# Patient Record
Sex: FEMALE | Race: WHITE | NOT HISPANIC OR LATINO | Employment: UNEMPLOYED | URBAN - METROPOLITAN AREA
[De-identification: names, ages, dates, MRNs, and addresses within clinical notes are randomized per-mention and may not be internally consistent; named-entity substitution may affect disease eponyms.]

---

## 2023-09-23 PROBLEM — R10.2 PELVIC PAIN IN FEMALE: Status: ACTIVE | Noted: 2023-09-23

## 2023-09-23 PROBLEM — D68.59 THROMBOPHILIA (MULTI): Status: ACTIVE | Noted: 2023-09-23

## 2023-09-23 PROBLEM — I51.9 LV DYSFUNCTION: Status: ACTIVE | Noted: 2023-09-23

## 2023-09-23 PROBLEM — R07.9 CHEST PAIN ON EXERTION: Status: ACTIVE | Noted: 2023-09-23

## 2023-09-23 PROBLEM — J45.909 ASTHMA (HHS-HCC): Status: ACTIVE | Noted: 2023-09-23

## 2023-09-23 PROBLEM — I50.42 CHRONIC COMBINED SYSTOLIC AND DIASTOLIC CHF (CONGESTIVE HEART FAILURE) (MULTI): Status: ACTIVE | Noted: 2023-09-23

## 2023-09-23 PROBLEM — M46.1 SACROILIAC INFLAMMATION (CMS-HCC): Status: ACTIVE | Noted: 2023-09-23

## 2023-09-23 PROBLEM — M47.817 ARTHRITIS OF LUMBOSACRAL SPINE: Status: ACTIVE | Noted: 2023-09-23

## 2023-09-23 PROBLEM — Z95.0 PRESENCE OF CARDIAC PACEMAKER: Status: ACTIVE | Noted: 2023-09-23

## 2023-09-23 PROBLEM — R00.0 TACHYCARDIA: Status: ACTIVE | Noted: 2023-09-23

## 2023-09-23 PROBLEM — R00.2 PALPITATIONS: Status: ACTIVE | Noted: 2023-09-23

## 2023-09-23 PROBLEM — R94.39 ABNORMAL STRESS TEST: Status: ACTIVE | Noted: 2023-09-23

## 2023-09-23 PROBLEM — I82.409 DVT (DEEP VENOUS THROMBOSIS) (MULTI): Status: ACTIVE | Noted: 2023-09-23

## 2023-09-23 PROBLEM — R06.09 DYSPNEA ON EXERTION: Status: ACTIVE | Noted: 2023-09-23

## 2023-09-23 PROBLEM — M47.812 ARTHRITIS OF FACET JOINT OF CERVICAL SPINE: Status: ACTIVE | Noted: 2023-09-23

## 2023-09-23 PROBLEM — N83.8 OVARIAN MASS, RIGHT: Status: ACTIVE | Noted: 2023-09-23

## 2023-09-23 PROBLEM — R42 LIGHTHEADEDNESS: Status: ACTIVE | Noted: 2023-09-23

## 2023-09-23 PROBLEM — G47.30 SLEEP APNEA: Status: ACTIVE | Noted: 2023-09-23

## 2023-09-23 PROBLEM — L08.9 SKIN INFECTION, BACTERIAL: Status: ACTIVE | Noted: 2023-09-23

## 2023-09-23 PROBLEM — E66.01 MORBID OBESITY (MULTI): Status: ACTIVE | Noted: 2023-09-23

## 2023-09-23 PROBLEM — F43.10 PTSD (POST-TRAUMATIC STRESS DISORDER): Status: ACTIVE | Noted: 2023-09-23

## 2023-09-23 PROBLEM — B96.89 SKIN INFECTION, BACTERIAL: Status: ACTIVE | Noted: 2023-09-23

## 2023-09-23 PROBLEM — I44.2 CHB (COMPLETE HEART BLOCK) (MULTI): Status: ACTIVE | Noted: 2023-09-23

## 2023-09-23 RX ORDER — CHOLECALCIFEROL (VITAMIN D3) 50 MCG
50 TABLET ORAL DAILY
COMMUNITY
End: 2023-10-05 | Stop reason: ENTERED-IN-ERROR

## 2023-09-23 RX ORDER — ISOSORBIDE MONONITRATE 30 MG/1
30 TABLET, EXTENDED RELEASE ORAL DAILY
COMMUNITY
Start: 2018-09-21 | End: 2023-10-24 | Stop reason: WASHOUT

## 2023-09-23 RX ORDER — SPIRONOLACTONE 25 MG/1
1 TABLET ORAL DAILY
COMMUNITY
Start: 2018-08-20 | End: 2023-10-05 | Stop reason: ENTERED-IN-ERROR

## 2023-09-23 RX ORDER — ACETAMINOPHEN 500 MG
50 TABLET ORAL DAILY
COMMUNITY

## 2023-09-23 RX ORDER — SULFASALAZINE 500 MG/1
TABLET ORAL
COMMUNITY
End: 2023-10-05 | Stop reason: ENTERED-IN-ERROR

## 2023-09-23 RX ORDER — DULOXETIN HYDROCHLORIDE 60 MG/1
90 CAPSULE, DELAYED RELEASE ORAL DAILY
COMMUNITY
Start: 2019-06-16

## 2023-09-23 RX ORDER — TRAMADOL HYDROCHLORIDE 50 MG/1
2 TABLET ORAL 3 TIMES DAILY PRN
COMMUNITY
End: 2023-10-05 | Stop reason: ENTERED-IN-ERROR

## 2023-09-23 RX ORDER — SPIRONOLACTONE 25 MG/1
0.5 TABLET ORAL DAILY
Status: ON HOLD | COMMUNITY
End: 2023-10-06 | Stop reason: SDUPTHER

## 2023-09-23 RX ORDER — BUDESONIDE AND FORMOTEROL FUMARATE DIHYDRATE 80; 4.5 UG/1; UG/1
2 AEROSOL RESPIRATORY (INHALATION) 2 TIMES DAILY
COMMUNITY

## 2023-09-23 RX ORDER — CARVEDILOL 25 MG/1
1 TABLET ORAL
COMMUNITY
Start: 2018-01-04 | End: 2023-10-24 | Stop reason: WASHOUT

## 2023-09-23 RX ORDER — TRAMADOL HYDROCHLORIDE 50 MG/1
1 TABLET ORAL 2 TIMES DAILY PRN
COMMUNITY
Start: 2021-12-06 | End: 2023-10-05 | Stop reason: ENTERED-IN-ERROR

## 2023-09-23 RX ORDER — OMEPRAZOLE 20 MG/1
1 TABLET, DELAYED RELEASE ORAL DAILY
COMMUNITY
End: 2023-10-05 | Stop reason: ENTERED-IN-ERROR

## 2023-09-23 RX ORDER — OXYBUTYNIN CHLORIDE 5 MG/1
5 TABLET ORAL DAILY
COMMUNITY
End: 2023-10-06 | Stop reason: HOSPADM

## 2023-09-23 RX ORDER — LEVOTHYROXINE SODIUM 25 UG/1
1 TABLET ORAL DAILY
COMMUNITY
Start: 2021-05-29

## 2023-09-23 RX ORDER — ALBUTEROL SULFATE 90 UG/1
2 AEROSOL, METERED RESPIRATORY (INHALATION) EVERY 6 HOURS PRN
COMMUNITY
Start: 2016-01-03

## 2023-09-23 RX ORDER — GABAPENTIN 800 MG/1
1 TABLET ORAL 2 TIMES DAILY
COMMUNITY
Start: 2022-02-15

## 2023-09-23 RX ORDER — RANOLAZINE 500 MG/1
500 TABLET, EXTENDED RELEASE ORAL EVERY 12 HOURS
COMMUNITY
Start: 2022-02-10

## 2023-09-23 RX ORDER — LOSARTAN POTASSIUM 50 MG/1
1 TABLET ORAL DAILY
COMMUNITY
Start: 2018-01-04 | End: 2023-10-24 | Stop reason: WASHOUT

## 2023-10-04 ENCOUNTER — APPOINTMENT (OUTPATIENT)
Dept: RADIOLOGY | Facility: HOSPITAL | Age: 48
DRG: 315 | End: 2023-10-04
Payer: MEDICARE

## 2023-10-04 ENCOUNTER — HOSPITAL ENCOUNTER (OUTPATIENT)
Facility: HOSPITAL | Age: 48
Setting detail: OBSERVATION
Discharge: HOME | DRG: 315 | End: 2023-10-06
Attending: EMERGENCY MEDICINE | Admitting: INTERNAL MEDICINE
Payer: MEDICARE

## 2023-10-04 ENCOUNTER — APPOINTMENT (OUTPATIENT)
Dept: CARDIOLOGY | Facility: HOSPITAL | Age: 48
DRG: 315 | End: 2023-10-04
Payer: MEDICARE

## 2023-10-04 DIAGNOSIS — I20.0 UNSTABLE ANGINA PECTORIS (MULTI): ICD-10-CM

## 2023-10-04 DIAGNOSIS — R00.0 TACHYCARDIA: ICD-10-CM

## 2023-10-04 DIAGNOSIS — R07.9 CHEST PAIN ON EXERTION: ICD-10-CM

## 2023-10-04 DIAGNOSIS — Q24.6: Primary | ICD-10-CM

## 2023-10-04 DIAGNOSIS — R07.9 CHEST PAIN: ICD-10-CM

## 2023-10-04 DIAGNOSIS — I51.9 LV DYSFUNCTION: ICD-10-CM

## 2023-10-04 DIAGNOSIS — I95.9 HYPOTENSION, UNSPECIFIED HYPOTENSION TYPE: ICD-10-CM

## 2023-10-04 DIAGNOSIS — R07.89 OTHER CHEST PAIN: ICD-10-CM

## 2023-10-04 DIAGNOSIS — R42 LIGHTHEADEDNESS: ICD-10-CM

## 2023-10-04 DIAGNOSIS — M46.1 INFLAMMATION OF SACROILIAC JOINT (CMS-HCC): ICD-10-CM

## 2023-10-04 DIAGNOSIS — R07.9 CHEST PAIN, UNSPECIFIED TYPE: ICD-10-CM

## 2023-10-04 DIAGNOSIS — Z95.0 PACEMAKER: ICD-10-CM

## 2023-10-04 LAB
ALBUMIN SERPL BCP-MCNC: 3.8 G/DL (ref 3.4–5)
ALP SERPL-CCNC: 74 U/L (ref 33–110)
ALT SERPL W P-5'-P-CCNC: 18 U/L (ref 7–45)
ANION GAP SERPL CALC-SCNC: 14 MMOL/L (ref 10–20)
AST SERPL W P-5'-P-CCNC: 19 U/L (ref 9–39)
BASOPHILS # BLD AUTO: 0.04 X10*3/UL (ref 0–0.1)
BASOPHILS NFR BLD AUTO: 0.6 %
BILIRUB SERPL-MCNC: 0.5 MG/DL (ref 0–1.2)
BUN SERPL-MCNC: 22 MG/DL (ref 6–23)
CALCIUM SERPL-MCNC: 9.4 MG/DL (ref 8.6–10.3)
CARDIAC TROPONIN I PNL SERPL HS: 3 NG/L (ref 0–13)
CARDIAC TROPONIN I PNL SERPL HS: 4 NG/L (ref 0–13)
CHLORIDE SERPL-SCNC: 109 MMOL/L (ref 98–107)
CO2 SERPL-SCNC: 20 MMOL/L (ref 21–32)
CREAT SERPL-MCNC: 0.95 MG/DL (ref 0.5–1.05)
EOSINOPHIL # BLD AUTO: 0.19 X10*3/UL (ref 0–0.7)
EOSINOPHIL NFR BLD AUTO: 3 %
ERYTHROCYTE [DISTWIDTH] IN BLOOD BY AUTOMATED COUNT: 12.9 % (ref 11.5–14.5)
GFR SERPL CREATININE-BSD FRML MDRD: 74 ML/MIN/1.73M*2
GLUCOSE SERPL-MCNC: 109 MG/DL (ref 74–99)
HCT VFR BLD AUTO: 33.5 % (ref 36–46)
HGB BLD-MCNC: 11.2 G/DL (ref 12–16)
IMM GRANULOCYTES # BLD AUTO: 0.01 X10*3/UL (ref 0–0.7)
IMM GRANULOCYTES NFR BLD AUTO: 0.2 % (ref 0–0.9)
LYMPHOCYTES # BLD AUTO: 2.65 X10*3/UL (ref 1.2–4.8)
LYMPHOCYTES NFR BLD AUTO: 41.3 %
MAGNESIUM SERPL-MCNC: 1.84 MG/DL (ref 1.6–2.4)
MCH RBC QN AUTO: 31.5 PG (ref 26–34)
MCHC RBC AUTO-ENTMCNC: 33.4 G/DL (ref 32–36)
MCV RBC AUTO: 94 FL (ref 80–100)
MONOCYTES # BLD AUTO: 0.41 X10*3/UL (ref 0.1–1)
MONOCYTES NFR BLD AUTO: 6.4 %
NEUTROPHILS # BLD AUTO: 3.12 X10*3/UL (ref 1.2–7.7)
NEUTROPHILS NFR BLD AUTO: 48.5 %
NRBC BLD-RTO: 0 /100 WBCS (ref 0–0)
PLATELET # BLD AUTO: 169 X10*3/UL (ref 150–450)
PMV BLD AUTO: 12.8 FL (ref 7.5–11.5)
POTASSIUM SERPL-SCNC: 3.8 MMOL/L (ref 3.5–5.3)
PROT SERPL-MCNC: 6.2 G/DL (ref 6.4–8.2)
RBC # BLD AUTO: 3.55 X10*6/UL (ref 4–5.2)
SODIUM SERPL-SCNC: 139 MMOL/L (ref 136–145)
WBC # BLD AUTO: 6.4 X10*3/UL (ref 4.4–11.3)

## 2023-10-04 PROCEDURE — 84484 ASSAY OF TROPONIN QUANT: CPT | Performed by: EMERGENCY MEDICINE

## 2023-10-04 PROCEDURE — 99223 1ST HOSP IP/OBS HIGH 75: CPT | Performed by: INTERNAL MEDICINE

## 2023-10-04 PROCEDURE — 36415 COLL VENOUS BLD VENIPUNCTURE: CPT | Performed by: EMERGENCY MEDICINE

## 2023-10-04 PROCEDURE — 80053 COMPREHEN METABOLIC PANEL: CPT | Performed by: EMERGENCY MEDICINE

## 2023-10-04 PROCEDURE — 83735 ASSAY OF MAGNESIUM: CPT | Performed by: EMERGENCY MEDICINE

## 2023-10-04 PROCEDURE — 99285 EMERGENCY DEPT VISIT HI MDM: CPT | Performed by: EMERGENCY MEDICINE

## 2023-10-04 PROCEDURE — 71045 X-RAY EXAM CHEST 1 VIEW: CPT | Mod: FR

## 2023-10-04 PROCEDURE — 71045 X-RAY EXAM CHEST 1 VIEW: CPT | Mod: FOREIGN READ | Performed by: RADIOLOGY

## 2023-10-04 PROCEDURE — 93005 ELECTROCARDIOGRAM TRACING: CPT

## 2023-10-04 PROCEDURE — 85025 COMPLETE CBC W/AUTO DIFF WBC: CPT | Performed by: EMERGENCY MEDICINE

## 2023-10-04 RX ORDER — NAPROXEN SODIUM 220 MG/1
324 TABLET, FILM COATED ORAL ONCE
Status: DISCONTINUED | OUTPATIENT
Start: 2023-10-04 | End: 2023-10-06 | Stop reason: HOSPADM

## 2023-10-04 ASSESSMENT — PAIN DESCRIPTION - DESCRIPTORS
DESCRIPTORS: PRESSURE

## 2023-10-04 ASSESSMENT — COLUMBIA-SUICIDE SEVERITY RATING SCALE - C-SSRS
6. HAVE YOU EVER DONE ANYTHING, STARTED TO DO ANYTHING, OR PREPARED TO DO ANYTHING TO END YOUR LIFE?: NO
1. IN THE PAST MONTH, HAVE YOU WISHED YOU WERE DEAD OR WISHED YOU COULD GO TO SLEEP AND NOT WAKE UP?: NO
2. HAVE YOU ACTUALLY HAD ANY THOUGHTS OF KILLING YOURSELF?: NO

## 2023-10-04 ASSESSMENT — PAIN DESCRIPTION - LOCATION: LOCATION: CHEST

## 2023-10-04 ASSESSMENT — LIFESTYLE VARIABLES
EVER HAD A DRINK FIRST THING IN THE MORNING TO STEADY YOUR NERVES TO GET RID OF A HANGOVER: NO
EVER FELT BAD OR GUILTY ABOUT YOUR DRINKING: NO
HAVE PEOPLE ANNOYED YOU BY CRITICIZING YOUR DRINKING: NO
HAVE YOU EVER FELT YOU SHOULD CUT DOWN ON YOUR DRINKING: NO

## 2023-10-04 ASSESSMENT — PAIN SCALES - GENERAL
PAINLEVEL_OUTOF10: 3
PAINLEVEL_OUTOF10: 3

## 2023-10-04 ASSESSMENT — PAIN DESCRIPTION - ONSET: ONSET: SUDDEN

## 2023-10-04 ASSESSMENT — PAIN DESCRIPTION - ORIENTATION: ORIENTATION: MID;ANTERIOR

## 2023-10-04 ASSESSMENT — PAIN - FUNCTIONAL ASSESSMENT: PAIN_FUNCTIONAL_ASSESSMENT: 0-10

## 2023-10-04 ASSESSMENT — PAIN DESCRIPTION - PAIN TYPE: TYPE: ACUTE PAIN

## 2023-10-04 ASSESSMENT — PAIN DESCRIPTION - DIRECTION: RADIATING_TOWARDS: NO

## 2023-10-04 ASSESSMENT — PAIN DESCRIPTION - FREQUENCY: FREQUENCY: CONSTANT/CONTINUOUS

## 2023-10-05 ENCOUNTER — TELEPHONE (OUTPATIENT)
Dept: CARDIOLOGY | Facility: CLINIC | Age: 48
End: 2023-10-05
Payer: MEDICARE

## 2023-10-05 PROBLEM — I95.9 HYPOTENSION: Status: ACTIVE | Noted: 2023-10-05

## 2023-10-05 LAB
CARDIAC TROPONIN I PNL SERPL HS: 4 NG/L (ref 0–13)
CARDIAC TROPONIN I PNL SERPL HS: 4 NG/L (ref 0–13)
CARDIAC TROPONIN I PNL SERPL HS: 5 NG/L (ref 0–13)

## 2023-10-05 PROCEDURE — 2060000001 HC INTERMEDIATE ICU ROOM DAILY

## 2023-10-05 PROCEDURE — 36415 COLL VENOUS BLD VENIPUNCTURE: CPT | Performed by: INTERNAL MEDICINE

## 2023-10-05 PROCEDURE — 2500000001 HC RX 250 WO HCPCS SELF ADMINISTERED DRUGS (ALT 637 FOR MEDICARE OP): Performed by: STUDENT IN AN ORGANIZED HEALTH CARE EDUCATION/TRAINING PROGRAM

## 2023-10-05 PROCEDURE — 2500000004 HC RX 250 GENERAL PHARMACY W/ HCPCS (ALT 636 FOR OP/ED): Performed by: INTERNAL MEDICINE

## 2023-10-05 PROCEDURE — G0378 HOSPITAL OBSERVATION PER HR: HCPCS

## 2023-10-05 PROCEDURE — 2500000002 HC RX 250 W HCPCS SELF ADMINISTERED DRUGS (ALT 637 FOR MEDICARE OP, ALT 636 FOR OP/ED): Performed by: INTERNAL MEDICINE

## 2023-10-05 PROCEDURE — 94640 AIRWAY INHALATION TREATMENT: CPT

## 2023-10-05 PROCEDURE — 2500000001 HC RX 250 WO HCPCS SELF ADMINISTERED DRUGS (ALT 637 FOR MEDICARE OP): Performed by: INTERNAL MEDICINE

## 2023-10-05 PROCEDURE — 84484 ASSAY OF TROPONIN QUANT: CPT | Performed by: INTERNAL MEDICINE

## 2023-10-05 PROCEDURE — 99232 SBSQ HOSP IP/OBS MODERATE 35: CPT | Performed by: INTERNAL MEDICINE

## 2023-10-05 RX ORDER — MULTIVIT-MIN/IRON FUM/FOLIC AC 7.5 MG-4
1 TABLET ORAL DAILY
Status: DISCONTINUED | OUTPATIENT
Start: 2023-10-05 | End: 2023-10-06 | Stop reason: HOSPADM

## 2023-10-05 RX ORDER — ACETAMINOPHEN 325 MG/1
650 TABLET ORAL EVERY 4 HOURS PRN
Status: DISCONTINUED | OUTPATIENT
Start: 2023-10-05 | End: 2023-10-06 | Stop reason: HOSPADM

## 2023-10-05 RX ORDER — POLYETHYLENE GLYCOL 3350 17 G/17G
17 POWDER, FOR SOLUTION ORAL DAILY
Status: DISCONTINUED | OUTPATIENT
Start: 2023-10-05 | End: 2023-10-06 | Stop reason: HOSPADM

## 2023-10-05 RX ORDER — UBIDECARENONE 75 MG
500 CAPSULE ORAL DAILY
Status: DISCONTINUED | OUTPATIENT
Start: 2023-10-05 | End: 2023-10-06 | Stop reason: HOSPADM

## 2023-10-05 RX ORDER — ISOSORBIDE MONONITRATE 30 MG/1
30 TABLET, EXTENDED RELEASE ORAL DAILY
Status: DISCONTINUED | OUTPATIENT
Start: 2023-10-05 | End: 2023-10-06 | Stop reason: HOSPADM

## 2023-10-05 RX ORDER — BISMUTH SUBSALICYLATE 262 MG
1 TABLET,CHEWABLE ORAL DAILY
Status: DISCONTINUED | OUTPATIENT
Start: 2023-10-05 | End: 2023-10-05

## 2023-10-05 RX ORDER — DULOXETIN HYDROCHLORIDE 30 MG/1
60 CAPSULE, DELAYED RELEASE ORAL NIGHTLY
Status: DISCONTINUED | OUTPATIENT
Start: 2023-10-05 | End: 2023-10-05

## 2023-10-05 RX ORDER — ACETAMINOPHEN 160 MG/5ML
650 SUSPENSION ORAL EVERY 4 HOURS PRN
Status: DISCONTINUED | OUTPATIENT
Start: 2023-10-05 | End: 2023-10-06 | Stop reason: HOSPADM

## 2023-10-05 RX ORDER — TRAMADOL HYDROCHLORIDE 50 MG/1
50 TABLET ORAL EVERY 6 HOURS PRN
Status: DISCONTINUED | OUTPATIENT
Start: 2023-10-05 | End: 2023-10-06 | Stop reason: HOSPADM

## 2023-10-05 RX ORDER — GABAPENTIN 400 MG/1
800 CAPSULE ORAL 2 TIMES DAILY
Status: DISCONTINUED | OUTPATIENT
Start: 2023-10-05 | End: 2023-10-06 | Stop reason: HOSPADM

## 2023-10-05 RX ORDER — ALBUTEROL SULFATE 90 UG/1
2 AEROSOL, METERED RESPIRATORY (INHALATION) EVERY 6 HOURS PRN
Status: DISCONTINUED | OUTPATIENT
Start: 2023-10-05 | End: 2023-10-06 | Stop reason: HOSPADM

## 2023-10-05 RX ORDER — PANTOPRAZOLE SODIUM 40 MG/1
40 TABLET, DELAYED RELEASE ORAL
Status: DISCONTINUED | OUTPATIENT
Start: 2023-10-06 | End: 2023-10-06 | Stop reason: HOSPADM

## 2023-10-05 RX ORDER — ACETAMINOPHEN 650 MG/1
650 SUPPOSITORY RECTAL EVERY 4 HOURS PRN
Status: DISCONTINUED | OUTPATIENT
Start: 2023-10-05 | End: 2023-10-06 | Stop reason: HOSPADM

## 2023-10-05 RX ORDER — OXYBUTYNIN CHLORIDE 5 MG/1
5 TABLET ORAL 2 TIMES DAILY
Status: DISCONTINUED | OUTPATIENT
Start: 2023-10-05 | End: 2023-10-06 | Stop reason: HOSPADM

## 2023-10-05 RX ORDER — LEVOTHYROXINE SODIUM 25 UG/1
25 TABLET ORAL DAILY
Status: DISCONTINUED | OUTPATIENT
Start: 2023-10-05 | End: 2023-10-06 | Stop reason: HOSPADM

## 2023-10-05 RX ORDER — CARVEDILOL 25 MG/1
25 TABLET ORAL
Status: DISCONTINUED | OUTPATIENT
Start: 2023-10-05 | End: 2023-10-06 | Stop reason: HOSPADM

## 2023-10-05 RX ORDER — BISACODYL 10 MG/1
10 SUPPOSITORY RECTAL DAILY PRN
Status: DISCONTINUED | OUTPATIENT
Start: 2023-10-05 | End: 2023-10-06 | Stop reason: HOSPADM

## 2023-10-05 RX ORDER — RANOLAZINE 500 MG/1
500 TABLET, EXTENDED RELEASE ORAL EVERY 12 HOURS
Status: DISCONTINUED | OUTPATIENT
Start: 2023-10-05 | End: 2023-10-06 | Stop reason: HOSPADM

## 2023-10-05 RX ORDER — DULOXETIN HYDROCHLORIDE 30 MG/1
120 CAPSULE, DELAYED RELEASE ORAL NIGHTLY
Status: DISCONTINUED | OUTPATIENT
Start: 2023-10-05 | End: 2023-10-06 | Stop reason: HOSPADM

## 2023-10-05 RX ORDER — FLUTICASONE FUROATE AND VILANTEROL 100; 25 UG/1; UG/1
1 POWDER RESPIRATORY (INHALATION)
Status: DISCONTINUED | OUTPATIENT
Start: 2023-10-05 | End: 2023-10-06 | Stop reason: HOSPADM

## 2023-10-05 RX ORDER — SPIRONOLACTONE 25 MG/1
25 TABLET ORAL DAILY
Status: DISCONTINUED | OUTPATIENT
Start: 2023-10-05 | End: 2023-10-06 | Stop reason: HOSPADM

## 2023-10-05 RX ORDER — CALCIUM CARBONATE 200(500)MG
1 TABLET,CHEWABLE ORAL DAILY
COMMUNITY
End: 2023-10-24 | Stop reason: WASHOUT

## 2023-10-05 RX ORDER — GUAIFENESIN/DEXTROMETHORPHAN 100-10MG/5
5 SYRUP ORAL EVERY 4 HOURS PRN
Status: DISCONTINUED | OUTPATIENT
Start: 2023-10-05 | End: 2023-10-06 | Stop reason: HOSPADM

## 2023-10-05 RX ORDER — SULFASALAZINE 500 MG/1
500 TABLET ORAL DAILY
Status: DISCONTINUED | OUTPATIENT
Start: 2023-10-05 | End: 2023-10-06 | Stop reason: HOSPADM

## 2023-10-05 RX ADMIN — RANOLAZINE 500 MG: 500 TABLET, FILM COATED, EXTENDED RELEASE ORAL at 18:26

## 2023-10-05 RX ADMIN — FLUTICASONE FUROATE AND VILANTEROL TRIFENATATE 1 PUFF: 100; 25 POWDER RESPIRATORY (INHALATION) at 18:23

## 2023-10-05 RX ADMIN — CARVEDILOL 25 MG: 25 TABLET, FILM COATED ORAL at 18:25

## 2023-10-05 RX ADMIN — SPIRONOLACTONE 25 MG: 25 TABLET, FILM COATED ORAL at 18:26

## 2023-10-05 RX ADMIN — DULOXETINE HYDROCHLORIDE 120 MG: 30 CAPSULE, DELAYED RELEASE ORAL at 23:13

## 2023-10-05 RX ADMIN — ISOSORBIDE MONONITRATE 30 MG: 30 TABLET, EXTENDED RELEASE ORAL at 18:27

## 2023-10-05 RX ADMIN — CYANOCOBALAMIN TAB 500 MCG 500 MCG: 500 TAB at 18:30

## 2023-10-05 RX ADMIN — GABAPENTIN 800 MG: 400 CAPSULE ORAL at 22:21

## 2023-10-05 RX ADMIN — OXYBUTYNIN CHLORIDE 5 MG: 5 TABLET ORAL at 22:20

## 2023-10-05 RX ADMIN — LEVOTHYROXINE SODIUM 25 MCG: 0.03 TABLET ORAL at 18:30

## 2023-10-05 RX ADMIN — TRAMADOL HYDROCHLORIDE 50 MG: 50 TABLET, COATED ORAL at 18:28

## 2023-10-05 SDOH — HEALTH STABILITY: MENTAL HEALTH: HOW OFTEN DO YOU HAVE A DRINK CONTAINING ALCOHOL?: NEVER

## 2023-10-05 SDOH — SOCIAL STABILITY: SOCIAL INSECURITY: HAS ANYONE EVER THREATENED TO HURT YOUR FAMILY OR YOUR PETS?: NO

## 2023-10-05 SDOH — ECONOMIC STABILITY: TRANSPORTATION INSECURITY
IN THE PAST 12 MONTHS, HAS LACK OF TRANSPORTATION KEPT YOU FROM MEETINGS, WORK, OR FROM GETTING THINGS NEEDED FOR DAILY LIVING?: NO

## 2023-10-05 SDOH — SOCIAL STABILITY: SOCIAL INSECURITY: DO YOU FEEL UNSAFE GOING BACK TO THE PLACE WHERE YOU ARE LIVING?: NO

## 2023-10-05 SDOH — HEALTH STABILITY: MENTAL HEALTH: HOW OFTEN DO YOU HAVE 6 OR MORE DRINKS ON ONE OCCASION?: NEVER

## 2023-10-05 SDOH — ECONOMIC STABILITY: INCOME INSECURITY: HOW HARD IS IT FOR YOU TO PAY FOR THE VERY BASICS LIKE FOOD, HOUSING, MEDICAL CARE, AND HEATING?: NOT VERY HARD

## 2023-10-05 SDOH — ECONOMIC STABILITY: HOUSING INSECURITY
IN THE LAST 12 MONTHS, WAS THERE A TIME WHEN YOU DID NOT HAVE A STEADY PLACE TO SLEEP OR SLEPT IN A SHELTER (INCLUDING NOW)?: NO

## 2023-10-05 SDOH — ECONOMIC STABILITY: INCOME INSECURITY: IN THE LAST 12 MONTHS, WAS THERE A TIME WHEN YOU WERE NOT ABLE TO PAY THE MORTGAGE OR RENT ON TIME?: NO

## 2023-10-05 SDOH — SOCIAL STABILITY: SOCIAL INSECURITY: ARE THERE ANY APPARENT SIGNS OF INJURIES/BEHAVIORS THAT COULD BE RELATED TO ABUSE/NEGLECT?: NO

## 2023-10-05 SDOH — SOCIAL STABILITY: SOCIAL INSECURITY: WERE YOU ABLE TO COMPLETE ALL THE BEHAVIORAL HEALTH SCREENINGS?: YES

## 2023-10-05 SDOH — SOCIAL STABILITY: SOCIAL INSECURITY: DOES ANYONE TRY TO KEEP YOU FROM HAVING/CONTACTING OTHER FRIENDS OR DOING THINGS OUTSIDE YOUR HOME?: NO

## 2023-10-05 SDOH — HEALTH STABILITY: MENTAL HEALTH: HOW MANY STANDARD DRINKS CONTAINING ALCOHOL DO YOU HAVE ON A TYPICAL DAY?: PATIENT DOES NOT DRINK

## 2023-10-05 SDOH — ECONOMIC STABILITY: TRANSPORTATION INSECURITY
IN THE PAST 12 MONTHS, HAS THE LACK OF TRANSPORTATION KEPT YOU FROM MEDICAL APPOINTMENTS OR FROM GETTING MEDICATIONS?: NO

## 2023-10-05 SDOH — SOCIAL STABILITY: SOCIAL INSECURITY: DO YOU FEEL ANYONE HAS EXPLOITED OR TAKEN ADVANTAGE OF YOU FINANCIALLY OR OF YOUR PERSONAL PROPERTY?: NO

## 2023-10-05 SDOH — SOCIAL STABILITY: SOCIAL INSECURITY: ARE YOU OR HAVE YOU BEEN THREATENED OR ABUSED PHYSICALLY, EMOTIONALLY, OR SEXUALLY BY ANYONE?: NO

## 2023-10-05 SDOH — SOCIAL STABILITY: SOCIAL INSECURITY: ABUSE: ADULT

## 2023-10-05 SDOH — ECONOMIC STABILITY: HOUSING INSECURITY: IN THE LAST 12 MONTHS, HOW MANY PLACES HAVE YOU LIVED?: 1

## 2023-10-05 ASSESSMENT — ACTIVITIES OF DAILY LIVING (ADL)
GROOMING: INDEPENDENT
GROOMING: INDEPENDENT
DRESSING YOURSELF: INDEPENDENT
JUDGMENT_ADEQUATE_SAFELY_COMPLETE_DAILY_ACTIVITIES: YES
ADEQUATE_TO_COMPLETE_ADL: YES
PATIENT'S MEMORY ADEQUATE TO SAFELY COMPLETE DAILY ACTIVITIES?: YES
HEARING - RIGHT EAR: FUNCTIONAL
HEARING - RIGHT EAR: FUNCTIONAL
TOILETING: INDEPENDENT
WALKS IN HOME: INDEPENDENT
BATHING: INDEPENDENT
BATHING: INDEPENDENT
FEEDING YOURSELF: INDEPENDENT
WALKS IN HOME: INDEPENDENT
PATIENT'S MEMORY ADEQUATE TO SAFELY COMPLETE DAILY ACTIVITIES?: YES
DRESSING YOURSELF: INDEPENDENT
JUDGMENT_ADEQUATE_SAFELY_COMPLETE_DAILY_ACTIVITIES: YES
TOILETING: INDEPENDENT
FEEDING YOURSELF: INDEPENDENT
HEARING - LEFT EAR: FUNCTIONAL
HEARING - LEFT EAR: FUNCTIONAL
ADEQUATE_TO_COMPLETE_ADL: YES

## 2023-10-05 ASSESSMENT — COGNITIVE AND FUNCTIONAL STATUS - GENERAL
DAILY ACTIVITIY SCORE: 24
PATIENT BASELINE BEDBOUND: NO
DAILY ACTIVITIY SCORE: 24
MOBILITY SCORE: 24
PATIENT BASELINE BEDBOUND: NO

## 2023-10-05 ASSESSMENT — ENCOUNTER SYMPTOMS
POLYDIPSIA: 0
SPEECH DIFFICULTY: 0
DIZZINESS: 0
BACK PAIN: 0
COUGH: 0
PALPITATIONS: 0
SEIZURES: 0
SORE THROAT: 0
ABDOMINAL PAIN: 0
WEAKNESS: 0
FEVER: 0
FLANK PAIN: 0
SHORTNESS OF BREATH: 1
CHEST TIGHTNESS: 1
CHILLS: 0
NECK PAIN: 0
FATIGUE: 0
NUMBNESS: 0
EYES NEGATIVE: 1
CONFUSION: 0
BLOOD IN STOOL: 0
VOMITING: 0
WHEEZING: 0
NAUSEA: 0
DYSURIA: 0

## 2023-10-05 ASSESSMENT — LIFESTYLE VARIABLES
AUDIT-C TOTAL SCORE: 0
HOW OFTEN DO YOU HAVE A DRINK CONTAINING ALCOHOL: NEVER
SKIP TO QUESTIONS 9-10: 1
SKIP TO QUESTIONS 9-10: 1
AUDIT-C TOTAL SCORE: 0
AUDIT-C TOTAL SCORE: 0
HOW OFTEN DO YOU HAVE 6 OR MORE DRINKS ON ONE OCCASION: NEVER
HOW MANY STANDARD DRINKS CONTAINING ALCOHOL DO YOU HAVE ON A TYPICAL DAY: PATIENT DOES NOT DRINK

## 2023-10-05 ASSESSMENT — COLUMBIA-SUICIDE SEVERITY RATING SCALE - C-SSRS
2. HAVE YOU ACTUALLY HAD ANY THOUGHTS OF KILLING YOURSELF?: NO
6. HAVE YOU EVER DONE ANYTHING, STARTED TO DO ANYTHING, OR PREPARED TO DO ANYTHING TO END YOUR LIFE?: NO
1. IN THE PAST MONTH, HAVE YOU WISHED YOU WERE DEAD OR WISHED YOU COULD GO TO SLEEP AND NOT WAKE UP?: NO

## 2023-10-05 ASSESSMENT — PATIENT HEALTH QUESTIONNAIRE - PHQ9
SUM OF ALL RESPONSES TO PHQ9 QUESTIONS 1 & 2: 0
1. LITTLE INTEREST OR PLEASURE IN DOING THINGS: NOT AT ALL
2. FEELING DOWN, DEPRESSED OR HOPELESS: NOT AT ALL

## 2023-10-05 ASSESSMENT — PAIN SCALES - WONG BAKER: WONGBAKER_NUMERICALRESPONSE: HURTS LITTLE BIT

## 2023-10-05 ASSESSMENT — PAIN SCALES - GENERAL
PAINLEVEL_OUTOF10: 0 - NO PAIN
PAINLEVEL_OUTOF10: 2

## 2023-10-05 NOTE — PROGRESS NOTES
Barbi Karimi is a 47 y.o. female on day 0 of admission presenting with Chest pain.      Subjective   History Of Present Illness  Barbi Karimi is a 47 y.o. female with past medical history of congenital heart block leading to pacemaker at age 16, hypertension, fibromyalgia, gastric sleeve procedure presented to the emergency room with complaints of chest pain.  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  States it was hot and so she sat down and actually even lay down outside of the trailer.  Patient became ashen short winded but not diaphoretic.  Patient's smart watch noted that her heart rate was 130 at that time.  Patient was brought into the emergency room where patient was pain-free upon arrival..  Patient was noted to be relatively hypotensive in the emergency room with systolic blood pressure dropping as low as 90.  Blood work upon arrival emergency room showed BMP significant only for a bicarb of 20 anion gap is only 14.  Patient's troponin was 3 with a repeat of 4.  Hemoglobin is 11.2..  Patient has chest x-ray with out infiltrate or CHF.  Patient is being admitted to the hospital for observation.  Cardiology consultation to be obtained consideration of stress testing.  Patient has reportedly pacer dependent rhythm although on telemetry monitoring it appears that she actually has a sinus rhythm at times.       Past medical history   She has a past medical history of Personal history of gestational diabetes and Personal history of other diseases of the musculoskeletal system and connective tissue.     Surgical History  She has a past surgical history that includes Cardiac pacemaker placement (08/11/2016); Other surgical history (08/11/2016); Cholecystectomy (08/11/2016); Hysterectomy (08/11/2016); and Insert / replace / remove pacemaker (08/11/2016).  Gastric sleeve procedure     Social History  No smoking no alcohol and no illicit drug  use     Family History  Family History          Family History   Problem Relation Name Age of Onset    Crohn's disease Mother        Hypertension Mother        Lung cancer Mother        Thyroid disease Mother        Diabetes Father        Hypertension Father        Thyroid disease Father                Allergies  Patient has no known allergies.    10/5: Patient seen at bedside and states her chest pain and shortness of breath have improved today. 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 She eventually went to walk to the bathroom with her  and she felt pressure on her chest and had difficulty taking a deep breath in. The patient has asthma but reports this did not feel like it was due to her asthma. Patient was hypotensive in the emergency room ranging from  systolic. Patient has a history of congestive heart failure and third-degree heart block. She has had a pacemaker since she was 16 years old. She also recently had gastric sleeve surgery and lost 80 pounds. Adjust blood pressure medications. Patient will need further cardiac work-up. Consult cardiology.        Objective     Last Recorded Vitals  /63   Pulse 60   Temp 36.5 °C (97.7 °F) (Skin)   Resp 13   Wt 97.5 kg (215 lb)   SpO2 96%   Intake/Output last 3 Shifts:  No intake or output data in the 24 hours ending 10/05/23 1534    Admission Weight  Weight: 97.5 kg (215 lb) (10/04/23 2000)    Daily Weight  10/04/23 : 97.5 kg (215 lb)    Image Results  XR chest 1 view  Narrative: STUDY:  Chest Radiograph;  10/04/2023 9:42 PM   INDICATION:  Chest pain.  COMPARISON:  XR chest 12/05/2022, 05/28/2021.   ACCESSION NUMBER(S):  [id number]  ORDERING CLINICIAN:  DEBBY PERRY  TECHNIQUE:  Frontal chest was obtained at 2130 hours.  FINDINGS:  CARDIOMEDIASTINAL SILHOUETTE:  Cardiomediastinal silhouette is normal in size and configuration.  Pacer leads are noted within the heart which are  unchanged position.     LUNGS:  Lungs are clear.     ABDOMEN:  No remarkable upper abdominal findings.     BONES:  No acute osseous changes.  Impression: No acute cardiopulmonary disease..  Signed by Marbin Babb MD      Physical Exam    Relevant Results  Scheduled medications  aspirin, 324 mg, oral, Once      Continuous medications     PRN medications    Results for orders placed or performed during the hospital encounter of 10/04/23 (from the past 24 hour(s))   CBC and Auto Differential   Result Value Ref Range    WBC 6.4 4.4 - 11.3 x10*3/uL    nRBC 0.0 0.0 - 0.0 /100 WBCs    RBC 3.55 (L) 4.00 - 5.20 x10*6/uL    Hemoglobin 11.2 (L) 12.0 - 16.0 g/dL    Hematocrit 33.5 (L) 36.0 - 46.0 %    MCV 94 80 - 100 fL    MCH 31.5 26.0 - 34.0 pg    MCHC 33.4 32.0 - 36.0 g/dL    RDW 12.9 11.5 - 14.5 %    Platelets 169 150 - 450 x10*3/uL    MPV 12.8 (H) 7.5 - 11.5 fL    Neutrophils % 48.5 40.0 - 80.0 %    Immature Granulocytes %, Automated 0.2 0.0 - 0.9 %    Lymphocytes % 41.3 13.0 - 44.0 %    Monocytes % 6.4 2.0 - 10.0 %    Eosinophils % 3.0 0.0 - 6.0 %    Basophils % 0.6 0.0 - 2.0 %    Neutrophils Absolute 3.12 1.20 - 7.70 x10*3/uL    Immature Granulocytes Absolute, Automated 0.01 0.00 - 0.70 x10*3/uL    Lymphocytes Absolute 2.65 1.20 - 4.80 x10*3/uL    Monocytes Absolute 0.41 0.10 - 1.00 x10*3/uL    Eosinophils Absolute 0.19 0.00 - 0.70 x10*3/uL    Basophils Absolute 0.04 0.00 - 0.10 x10*3/uL   Comprehensive Metabolic Panel   Result Value Ref Range    Glucose 109 (H) 74 - 99 mg/dL    Sodium 139 136 - 145 mmol/L    Potassium 3.8 3.5 - 5.3 mmol/L    Chloride 109 (H) 98 - 107 mmol/L    Bicarbonate 20 (L) 21 - 32 mmol/L    Anion Gap 14 10 - 20 mmol/L    Urea Nitrogen 22 6 - 23 mg/dL    Creatinine 0.95 0.50 - 1.05 mg/dL    eGFR 74 >60 mL/min/1.73m*2    Calcium 9.4 8.6 - 10.3 mg/dL    Albumin 3.8 3.4 - 5.0 g/dL    Alkaline Phosphatase 74 33 - 110 U/L    Total Protein 6.2 (L) 6.4 - 8.2 g/dL    AST 19 9 - 39 U/L    Bilirubin, Total  0.5 0.0 - 1.2 mg/dL    ALT 18 7 - 45 U/L   Magnesium   Result Value Ref Range    Magnesium 1.84 1.60 - 2.40 mg/dL   Troponin I, High Sensitivity, Initial   Result Value Ref Range    Troponin I, High Sensitivity 4 0 - 13 ng/L   Troponin, High Sensitivity, 1 Hour   Result Value Ref Range    Troponin I, High Sensitivity 3 0 - 13 ng/L                    Assessment/Plan                  Principal Problem:    Chest pain  Active Problems:    CHB (complete heart block) (CMS/HCC)    Hypotension    Cardiac work-up  Recheck EKG   treadmill nuclear stress in the a.m.  Regular echocardiogram in a.m.  Consult cardiology  Case discussed with cardiology Dr Sanchez  Adjust blood pressure medication  Will checks labs in AM  See orders for complete plan                  Meredith Canales

## 2023-10-05 NOTE — ASSESSMENT & PLAN NOTE
Patient with transient chest pain associated with dyspnea.  Concern for patient having dysrhythmia or underlying ischemia.  Patient reports that she had a heart cath about 2 years ago and was placed on Ranexa afterwards but denies having significant coronary artery disease.  Cardiology consultation to be obtained.  Serial cardiac enzymes.

## 2023-10-05 NOTE — H&P
History Of Present Illness  Barbi Karimi is a 47 y.o. female with past medical history of congenital heart block leading to pacemaker at age 16, hypertension, fibromyalgia, gastric sleeve procedure presented to the emergency room with complaints of chest pain.  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  States it was hot and so she sat down and actually even lay down outside of the trailer.  Patient became ashen short winded but not diaphoretic.  Patient's smart watch noted that her heart rate was 130 at that time.  Patient was brought into the emergency room where patient was pain-free upon arrival..  Patient was noted to be relatively hypotensive in the emergency room with systolic blood pressure dropping as low as 90.  Blood work upon arrival emergency room showed BMP significant only for a bicarb of 20 anion gap is only 14.  Patient's troponin was 3 with a repeat of 4.  Hemoglobin is 11.2..  Patient has chest x-ray with out infiltrate or CHF.  Patient is being admitted to the hospital for observation.  Cardiology consultation to be obtained consideration of stress testing.  Patient has reportedly pacer dependent rhythm although on telemetry monitoring it appears that she actually has a sinus rhythm at times.      Past medical history   She has a past medical history of Personal history of gestational diabetes and Personal history of other diseases of the musculoskeletal system and connective tissue.    Surgical History  She has a past surgical history that includes Cardiac pacemaker placement (08/11/2016); Other surgical history (08/11/2016); Cholecystectomy (08/11/2016); Hysterectomy (08/11/2016); and Insert / replace / remove pacemaker (08/11/2016).  Gastric sleeve procedure     Social History  No smoking no alcohol and no illicit drug use    Family History  Family History   Problem Relation Name Age of Onset    Crohn's disease Mother       Hypertension Mother      Lung cancer Mother      Thyroid disease Mother      Diabetes Father      Hypertension Father      Thyroid disease Father          Allergies  Patient has no known allergies.    Meds    Current Facility-Administered Medications:     aspirin chewable tablet 324 mg, 324 mg, oral, Once, Celeste Aburto MD    Current Outpatient Medications:     albuterol (ProAir HFA) 90 mcg/actuation inhaler, Inhale., Disp: , Rfl:     BIOTIN ORAL, Take by mouth., Disp: , Rfl:     budesonide-formoteroL (Symbicort) 80-4.5 mcg/actuation inhaler, Inhale 2 puffs 2 times a day. RINSE MOUTH AFTER USE, Disp: , Rfl:     CALCIUM ORAL, Take by mouth., Disp: , Rfl:     carvedilol (Coreg) 25 mg tablet, Take 1 tablet (25 mg) by mouth 2 times a day with meals., Disp: , Rfl:     cholecalciferol (Vitamin D-3) 50 mcg (2,000 unit) capsule, Take 1 capsule (50 mcg) by mouth early in the morning.., Disp: , Rfl:     cholecalciferol (Vitamin D-3) 50 MCG (2000 UT) tablet, Take 1 tablet (50 mcg) by mouth once daily., Disp: , Rfl:     cyanocobalamin, vitamin B-12, (VITAMIN B-12 ORAL), Take by mouth., Disp: , Rfl:     DULoxetine (Cymbalta) 60 mg DR capsule, Take 2 tablets by mouth once daily., Disp: , Rfl:     duloxetine HCl (DULOXETINE ORAL), Take 120 mg by mouth once daily., Disp: , Rfl:     gabapentin (Neurontin) 800 mg tablet, Take 1 tablet (800 mg) by mouth 2 times a day., Disp: , Rfl:     isosorbide mononitrate ER (Imdur) 30 mg 24 hr tablet, Take 1 tablet (30 mg) by mouth once daily., Disp: , Rfl:     levothyroxine (Synthroid, Levoxyl) 25 mcg tablet, Take 1 tablet (25 mcg) by mouth once daily., Disp: , Rfl:     losartan (Cozaar) 50 mg tablet, Take 1 tablet (50 mg) by mouth once daily., Disp: , Rfl:     MULTIVITAMIN ORAL, Take by mouth., Disp: , Rfl:     omeprazole OTC (PriLOSEC OTC) 20 mg EC tablet, Take 1 tablet (20 mg) by mouth once daily., Disp: , Rfl:     oxybutynin (Ditropan) 5 mg tablet, , Disp: , Rfl:     ranolazine  (Ranexa) 500 mg 12 hr tablet, Take 1 tablet (500 mg) by mouth every 12 hours., Disp: , Rfl:     rivaroxaban (Xarelto) 10 mg tablet, Take by mouth., Disp: , Rfl:     spironolactone (Aldactone) 25 mg tablet, Take 1 tablet (25 mg) by mouth once daily., Disp: , Rfl:     spironolactone (Aldactone) 25 mg tablet, Take 0.5 tablets (12.5 mg) by mouth once daily., Disp: , Rfl:     sulfaSALAzine (Azulfidine) 500 mg tablet, Take by mouth., Disp: , Rfl:     traMADol (Ultram) 50 mg tablet, Take 2 tablets (100 mg) by mouth 3 times a day as needed., Disp: , Rfl:     traMADol (Ultram) 50 mg tablet, Take 1 tablet (50 mg) by mouth 2 times a day as needed., Disp: , Rfl:     Review of Systems   Constitutional:  Negative for chills, fatigue and fever.   HENT:  Negative for congestion and sore throat.    Eyes: Negative.    Respiratory:  Positive for chest tightness and shortness of breath. Negative for cough and wheezing.    Cardiovascular:  Negative for palpitations and leg swelling.   Gastrointestinal:  Negative for abdominal pain, blood in stool, nausea and vomiting.   Endocrine: Negative for polydipsia and polyuria.   Genitourinary:  Negative for dysuria and flank pain.   Musculoskeletal:  Negative for back pain, gait problem and neck pain.   Skin: Negative.    Neurological:  Negative for dizziness, seizures, speech difficulty, weakness and numbness.   Psychiatric/Behavioral:  Negative for confusion.         Physical Exam  Constitutional:       Appearance: Normal appearance.   HENT:      Head: Normocephalic and atraumatic.      Mouth/Throat:      Mouth: Mucous membranes are moist.   Eyes:      Extraocular Movements: Extraocular movements intact.      Pupils: Pupils are equal, round, and reactive to light.   Cardiovascular:      Rate and Rhythm: Normal rate and regular rhythm.      Pulses: Normal pulses.      Heart sounds: Normal heart sounds.      Comments: Pacemaker present  Pulmonary:      Effort: Pulmonary effort is normal.       Breath sounds: Normal breath sounds.   Abdominal:      General: Abdomen is flat. Bowel sounds are normal.      Palpations: Abdomen is soft.      Tenderness: There is no abdominal tenderness.   Musculoskeletal:         General: Normal range of motion.   Skin:     Coloration: Skin is not jaundiced.      Findings: No erythema or rash.   Neurological:      General: No focal deficit present.      Mental Status: She is alert and oriented to person, place, and time.   Psychiatric:         Mood and Affect: Mood normal.         Thought Content: Thought content normal.         Judgment: Judgment normal.          Last Recorded Vitals  /64   Pulse 60   Temp 36.5 °C (97.7 °F) (Skin)   Resp 10   Wt 97.5 kg (215 lb)   SpO2 100%     Relevant Results     Results for orders placed or performed during the hospital encounter of 10/04/23 (from the past 24 hour(s))   CBC and Auto Differential   Result Value Ref Range    WBC 6.4 4.4 - 11.3 x10*3/uL    nRBC 0.0 0.0 - 0.0 /100 WBCs    RBC 3.55 (L) 4.00 - 5.20 x10*6/uL    Hemoglobin 11.2 (L) 12.0 - 16.0 g/dL    Hematocrit 33.5 (L) 36.0 - 46.0 %    MCV 94 80 - 100 fL    MCH 31.5 26.0 - 34.0 pg    MCHC 33.4 32.0 - 36.0 g/dL    RDW 12.9 11.5 - 14.5 %    Platelets 169 150 - 450 x10*3/uL    MPV 12.8 (H) 7.5 - 11.5 fL    Neutrophils % 48.5 40.0 - 80.0 %    Immature Granulocytes %, Automated 0.2 0.0 - 0.9 %    Lymphocytes % 41.3 13.0 - 44.0 %    Monocytes % 6.4 2.0 - 10.0 %    Eosinophils % 3.0 0.0 - 6.0 %    Basophils % 0.6 0.0 - 2.0 %    Neutrophils Absolute 3.12 1.20 - 7.70 x10*3/uL    Immature Granulocytes Absolute, Automated 0.01 0.00 - 0.70 x10*3/uL    Lymphocytes Absolute 2.65 1.20 - 4.80 x10*3/uL    Monocytes Absolute 0.41 0.10 - 1.00 x10*3/uL    Eosinophils Absolute 0.19 0.00 - 0.70 x10*3/uL    Basophils Absolute 0.04 0.00 - 0.10 x10*3/uL   Comprehensive Metabolic Panel   Result Value Ref Range    Glucose 109 (H) 74 - 99 mg/dL    Sodium 139 136 - 145 mmol/L    Potassium 3.8 3.5  - 5.3 mmol/L    Chloride 109 (H) 98 - 107 mmol/L    Bicarbonate 20 (L) 21 - 32 mmol/L    Anion Gap 14 10 - 20 mmol/L    Urea Nitrogen 22 6 - 23 mg/dL    Creatinine 0.95 0.50 - 1.05 mg/dL    eGFR 74 >60 mL/min/1.73m*2    Calcium 9.4 8.6 - 10.3 mg/dL    Albumin 3.8 3.4 - 5.0 g/dL    Alkaline Phosphatase 74 33 - 110 U/L    Total Protein 6.2 (L) 6.4 - 8.2 g/dL    AST 19 9 - 39 U/L    Bilirubin, Total 0.5 0.0 - 1.2 mg/dL    ALT 18 7 - 45 U/L   Magnesium   Result Value Ref Range    Magnesium 1.84 1.60 - 2.40 mg/dL   Troponin I, High Sensitivity, Initial   Result Value Ref Range    Troponin I, High Sensitivity 4 0 - 13 ng/L   Troponin, High Sensitivity, 1 Hour   Result Value Ref Range    Troponin I, High Sensitivity 3 0 - 13 ng/L        Recent Imaging  XR chest 1 view    Result Date: 10/4/2023  STUDY: Chest Radiograph;  10/04/2023 9:42 PM INDICATION: Chest pain. COMPARISON: XR chest 12/05/2022, 05/28/2021. ACCESSION NUMBER(S): [id number] ORDERING CLINICIAN: DEBBY PERRY TECHNIQUE:  Frontal chest was obtained at 2130 hours. FINDINGS: CARDIOMEDIASTINAL SILHOUETTE: Cardiomediastinal silhouette is normal in size and configuration. Pacer leads are noted within the heart which are unchanged position.  LUNGS: Lungs are clear.  ABDOMEN: No remarkable upper abdominal findings.  BONES: No acute osseous changes.    No acute cardiopulmonary disease.. Signed by Marbin Babb MD       Assessment and Plan  Chest pain  Patient with transient chest pain associated with dyspnea.  Concern for patient having dysrhythmia or underlying ischemia.  Patient reports that she had a heart cath about 2 years ago and was placed on Ranexa afterwards but denies having significant coronary artery disease.  Cardiology consultation to be obtained.  Serial cardiac enzymes.    Hypotension  Gentle fluid boluses for patient patient exhibits a none anion gap metabolic acidosis.  Patient's losartan will be held and can be resumed as necessary.    CHB  (complete heart block) (CMS/HCC)  Patient with congenital heart block with resultant pacemaker.          Rolando Crowe MD

## 2023-10-05 NOTE — ED PROVIDER NOTES
HPI   Chief Complaint   Patient presents with    Chest Pain     Patient states chest pain in mid chest to left side of back       Patient presents the emergency department secondary to chest pain.  Developed chest pressure this evening.  It radiated into her left side of her back.  Mild shortness of breath with it.  No recent episodes of chest pain.  Patient has a history of congestive heart failure and third-degree heart block.  Patient has had a pacemaker since she was 16 years old because of the third-degree heart block.  Patient states that the chest pain has nearly resolved at this time.  She would rated as a 1 out of 10.  No recent travel or surgery.  Pain is not pleuritic.  No lower extremity swelling.  Patient recently had her spironolactone decreased because she had gastric sleeve surgery and had lost 80 pounds.  She sees Dr. Sanchez as her cardiologist.                        No data recorded                Patient History   Past Medical History:   Diagnosis Date    Personal history of gestational diabetes     History of gestational diabetes mellitus (GDM)    Personal history of other diseases of the musculoskeletal system and connective tissue     History of fibromyalgia     Past Surgical History:   Procedure Laterality Date    CARDIAC PACEMAKER PLACEMENT  08/11/2016    Pacemaker Placement    CHOLECYSTECTOMY  08/11/2016    Cholecystectomy    HYSTERECTOMY  08/11/2016    Hysterectomy    INSERT / REPLACE / REMOVE PACEMAKER  08/11/2016    Pacemaker - Pulse Generator Replacement    OTHER SURGICAL HISTORY  08/11/2016    Gynecologic Surgery     Family History   Problem Relation Name Age of Onset    Crohn's disease Mother      Hypertension Mother      Lung cancer Mother      Thyroid disease Mother      Diabetes Father      Hypertension Father      Thyroid disease Father       Social History     Tobacco Use    Smoking status: Not on file    Smokeless tobacco: Not on file   Substance Use Topics    Alcohol use: Not  on file    Drug use: Not on file       Physical Exam   ED Triage Vitals [10/04/23 2000]   Temp Heart Rate Resp BP   36.5 °C (97.7 °F) 69 18 99/65      SpO2 Temp Source Heart Rate Source Patient Position   100 % Skin Monitor Lying      BP Location FiO2 (%)     Right arm --       Physical Exam  Vitals and nursing note reviewed.   Constitutional:       Appearance: Normal appearance. She is well-developed.   HENT:      Head: Normocephalic and atraumatic.      Right Ear: Tympanic membrane normal.      Left Ear: Tympanic membrane normal.      Nose: Nose normal.      Mouth/Throat:      Mouth: Mucous membranes are moist.   Eyes:      Extraocular Movements: Extraocular movements intact.      Pupils: Pupils are equal, round, and reactive to light.   Cardiovascular:      Rate and Rhythm: Normal rate and regular rhythm.      Pulses: Normal pulses.      Heart sounds: Normal heart sounds.   Pulmonary:      Effort: Pulmonary effort is normal.      Breath sounds: Normal breath sounds.   Chest:      Chest wall: No tenderness or edema.   Abdominal:      General: Abdomen is flat. Bowel sounds are normal.      Palpations: Abdomen is soft.   Musculoskeletal:         General: Normal range of motion.      Cervical back: Normal range of motion.   Skin:     General: Skin is warm and dry.      Capillary Refill: Capillary refill takes less than 2 seconds.   Neurological:      General: No focal deficit present.      Mental Status: She is alert and oriented to person, place, and time.   Psychiatric:         Mood and Affect: Mood normal. Mood is not anxious.         Behavior: Behavior normal. Behavior is not agitated.       ED Course & MDM   ED Course as of 10/04/23 2316   Wed Oct 04, 2023   2126 XR chest 1 view [GF]      ED Course User Index  [GF] Celeste Aburto MD         Diagnoses as of 10/04/23 2316   Chest pain, unspecified type   Chest pain       Medical Decision Making  Patient presents secondary to chest pain.  Differential  diagnosis for this patient is coronary artery disease, congestive heart failure, pneumonia or other acute cause.  Patient is evaluated in the emergency part with EKG chest x-ray and lab work.  Patient has minimal pain at this time.    Labs Reviewed  CBC WITH AUTO DIFFERENTIAL - Abnormal     WBC                           6.4                    nRBC                          0.0                    RBC                           3.55 (*)               Hemoglobin                    11.2 (*)               Hematocrit                    33.5 (*)               MCV                           94                     MCH                           31.5                   MCHC                          33.4                   RDW                           12.9                   Platelets                     169                    MPV                           12.8 (*)               Neutrophils %                 48.5                   Immature Granulocytes %, Automated   0.2                    Lymphocytes %                 41.3                   Monocytes %                   6.4                    Eosinophils %                 3.0                    Basophils %                   0.6                    Neutrophils Absolute          3.12                   Immature Granulocytes Absolute, Au*   0.01                   Lymphocytes Absolute          2.65                   Monocytes Absolute            0.41                   Eosinophils Absolute          0.19                   Basophils Absolute            0.04                COMPREHENSIVE METABOLIC PANEL - Abnormal     Glucose                       109 (*)                Sodium                        139                    Potassium                     3.8                    Chloride                      109 (*)                Bicarbonate                   20 (*)                 Anion Gap                     14                     Urea Nitrogen                 22                     Creatinine                     0.95                   eGFR                          74                     Calcium                       9.4                    Albumin                       3.8                    Alkaline Phosphatase          74                     Total Protein                 6.2 (*)                AST                           19                     Bilirubin, Total              0.5                    ALT                           18                  MAGNESIUM - Normal     Magnesium                     1.84                SERIAL TROPONIN-INITIAL - Normal     Troponin I, High Sensitivity   4                          Narrative: Less than 99th percentile of normal range cutoff-                  Female and children under 18 years old <14 ng/L; Male <21 ng/L: Negative                  Repeat testing should be performed if clinically indicated.                                     Female and children under 18 years old 14-50 ng/L; Male 21-50 ng/L:                  Consistent with possible cardiac damage and possible increased clinical                   risk. Serial measurements may help to assess extent of myocardial damage.                                     >50 ng/L: Consistent with cardiac damage, increased clinical risk and                  myocardial infarction. Serial measurements may help assess extent of                   myocardial damage.                                      NOTE: Children less than 1 year old may have higher baseline troponin                   levels and results should be interpreted in conjunction with the overall                   clinical context.                                     NOTE: Troponin I testing is performed using a different                   testing methodology at [facility] [place] than at other                   [facility]. Direct result comparisons should only                   be made within the same method.  SERIAL TROPONIN, 1 HOUR - Normal     Troponin  I, High Sensitivity   3                          Narrative: Less than 99th percentile of normal range cutoff-                  Female and children under 18 years old <14 ng/L; Male <21 ng/L: Negative                  Repeat testing should be performed if clinically indicated.                                     Female and children under 18 years old 14-50 ng/L; Male 21-50 ng/L:                  Consistent with possible cardiac damage and possible increased clinical                   risk. Serial measurements may help to assess extent of myocardial damage.                                     >50 ng/L: Consistent with cardiac damage, increased clinical risk and                  myocardial infarction. Serial measurements may help assess extent of                   myocardial damage.                                      NOTE: Children less than 1 year old may have higher baseline troponin                   levels and results should be interpreted in conjunction with the overall                   clinical context.                                     NOTE: Troponin I testing is performed using a different                   testing methodology at [facility] than at other                   system hospitals. Direct result comparisons should only                   be made within the same method.  TROPONIN SERIES- (INITIAL, 1 HR)    XR chest 1 view   Final Result    No acute cardiopulmonary disease..    Signed by Marbin Babb MD     Patient is laboratory work-up shows a negative troponin.  Chemistry shows no acute abnormalities that require intervention.  Patient is now chest pain-free without intervention by myself.  Chest x-ray shows no acute pulmonary disease.  Patient has a cardiac history and has risk of cardiac cause of her symptoms.  My recommendation is the patient be admitted to the hospital for further cardiac work-up and consultation with cardiology.  She likely is in need of an adjustment in her blood  pressure medication because her blood pressures have been running on the low side in the emergency department.  Patient was discussed with Dr. Crowe from the hospitalist service and plan is for observation admission and further cardiac work-up.        Procedure  ECG 12 lead    Performed by: Celeste Aburto MD  Authorized by: Celeste Aburto MD    Previous ECG:     Previous ECG:  Unavailable  Interpretation:     Interpretation: non-specific      Details:  EKG was obtained at 7:55 PM.  It is sinus rhythm rate of 73.  It is a paced rhythm.  QTc is 493.  No acute ST elevation.  Rate:     ECG rate assessment: normal    Rhythm:     Rhythm: paced    ECG 12 lead    Performed by: Celeste Aburto MD  Authorized by: Celeste Aburto MD    Previous ECG:     Previous ECG:  Compared to current    Similarity:  No change  Interpretation:     Interpretation: non-specific      Details:  Patient EKG was performed at 9:28 PM.  It is sinus rhythm rate of 60.  Nonspecific interventricular conduction delay.  IL interval is 205 and QTc is 479.  No acute ST elevation.       Celeste Aburto MD  10/04/23 9885

## 2023-10-05 NOTE — ASSESSMENT & PLAN NOTE
Gentle fluid boluses for patient patient exhibits a none anion gap metabolic acidosis.  Patient's losartan will be held and can be resumed as necessary.

## 2023-10-06 ENCOUNTER — APPOINTMENT (OUTPATIENT)
Dept: CARDIOLOGY | Facility: HOSPITAL | Age: 48
DRG: 315 | End: 2023-10-06
Payer: MEDICARE

## 2023-10-06 ENCOUNTER — APPOINTMENT (OUTPATIENT)
Dept: RADIOLOGY | Facility: HOSPITAL | Age: 48
DRG: 315 | End: 2023-10-06
Payer: MEDICARE

## 2023-10-06 VITALS
TEMPERATURE: 97.2 F | OXYGEN SATURATION: 94 % | BODY MASS INDEX: 33.43 KG/M2 | HEIGHT: 67 IN | HEART RATE: 60 BPM | RESPIRATION RATE: 16 BRPM | DIASTOLIC BLOOD PRESSURE: 62 MMHG | SYSTOLIC BLOOD PRESSURE: 101 MMHG | WEIGHT: 213 LBS

## 2023-10-06 PROBLEM — E55.9 VITAMIN D DEFICIENCY: Status: ACTIVE | Noted: 2022-10-11

## 2023-10-06 PROBLEM — R07.9 CHEST PAIN: Status: RESOLVED | Noted: 2023-10-04 | Resolved: 2023-10-06

## 2023-10-06 PROBLEM — F34.1 DYSTHYMIA: Status: ACTIVE | Noted: 2019-03-15

## 2023-10-06 PROBLEM — I95.9 HYPOTENSION: Status: RESOLVED | Noted: 2023-10-05 | Resolved: 2023-10-06

## 2023-10-06 PROBLEM — M54.9 BACK PAIN: Status: ACTIVE | Noted: 2021-03-31

## 2023-10-06 PROBLEM — S06.4XAD: Status: ACTIVE | Noted: 2023-03-06

## 2023-10-06 PROBLEM — M67.442 MUCOUS CYST OF DIGIT OF LEFT HAND: Status: ACTIVE | Noted: 2023-03-16

## 2023-10-06 PROBLEM — T88.4XXA DIFFICULT INTUBATION: Status: ACTIVE | Noted: 2023-02-22

## 2023-10-06 PROBLEM — F41.1 GAD (GENERALIZED ANXIETY DISORDER): Status: ACTIVE | Noted: 2022-11-11

## 2023-10-06 PROBLEM — R91.1 LUNG NODULE: Status: ACTIVE | Noted: 2023-05-17

## 2023-10-06 PROBLEM — R53.83 FATIGUE: Status: ACTIVE | Noted: 2019-04-24

## 2023-10-06 PROBLEM — E66.01 MORBID OBESITY DUE TO EXCESS CALORIES (MULTI): Status: ACTIVE | Noted: 2022-12-15

## 2023-10-06 PROBLEM — M79.10 MUSCLE PAIN: Status: ACTIVE | Noted: 2019-04-24

## 2023-10-06 PROBLEM — R73.03 PREDIABETES: Status: ACTIVE | Noted: 2021-03-31

## 2023-10-06 PROBLEM — M19.91 PRIMARY OSTEOARTHRITIS: Status: ACTIVE | Noted: 2022-11-11

## 2023-10-06 PROBLEM — D68.2 FACTOR V DEFICIENCY (MULTI): Status: ACTIVE | Noted: 2023-02-20

## 2023-10-06 PROBLEM — E03.9 HYPOTHYROID: Status: ACTIVE | Noted: 2021-03-31

## 2023-10-06 PROBLEM — M79.7 FIBROMYALGIA: Status: ACTIVE | Noted: 2019-01-01

## 2023-10-06 PROBLEM — I10 ESSENTIAL HYPERTENSION: Status: ACTIVE | Noted: 2021-10-27

## 2023-10-06 PROBLEM — K21.9 GASTROESOPHAGEAL REFLUX DISEASE WITHOUT ESOPHAGITIS: Status: ACTIVE | Noted: 2021-06-11

## 2023-10-06 PROBLEM — Z95.0 PACEMAKER: Status: ACTIVE | Noted: 2022-11-11

## 2023-10-06 PROBLEM — I51.9 LEFT VENTRICULAR DYSFUNCTION: Status: ACTIVE | Noted: 2022-08-29

## 2023-10-06 PROBLEM — N83.8 MASS OF RIGHT OVARY: Status: ACTIVE | Noted: 2023-05-17

## 2023-10-06 PROBLEM — G47.33 OSA (OBSTRUCTIVE SLEEP APNEA): Status: ACTIVE | Noted: 2021-03-31

## 2023-10-06 PROBLEM — M46.1 INFLAMMATION OF SACROILIAC JOINT (CMS-HCC): Status: ACTIVE | Noted: 2022-10-14

## 2023-10-06 PROBLEM — R12 HEARTBURN: Status: ACTIVE | Noted: 2022-12-23

## 2023-10-06 LAB
ANION GAP SERPL CALC-SCNC: 9 MMOL/L (ref 10–20)
BASOPHILS # BLD AUTO: 0.03 X10*3/UL (ref 0–0.1)
BASOPHILS NFR BLD AUTO: 0.5 %
BUN SERPL-MCNC: 22 MG/DL (ref 6–23)
CALCIUM SERPL-MCNC: 9.3 MG/DL (ref 8.6–10.3)
CHLORIDE SERPL-SCNC: 109 MMOL/L (ref 98–107)
CO2 SERPL-SCNC: 28 MMOL/L (ref 21–32)
CREAT SERPL-MCNC: 0.87 MG/DL (ref 0.5–1.05)
EJECTION FRACTION APICAL 4 CHAMBER: 41.1
EOSINOPHIL # BLD AUTO: 0.18 X10*3/UL (ref 0–0.7)
EOSINOPHIL NFR BLD AUTO: 3.1 %
ERYTHROCYTE [DISTWIDTH] IN BLOOD BY AUTOMATED COUNT: 13 % (ref 11.5–14.5)
GFR SERPL CREATININE-BSD FRML MDRD: 82 ML/MIN/1.73M*2
GLUCOSE SERPL-MCNC: 91 MG/DL (ref 74–99)
HCT VFR BLD AUTO: 35.3 % (ref 36–46)
HGB BLD-MCNC: 11.5 G/DL (ref 12–16)
IMM GRANULOCYTES # BLD AUTO: 0.01 X10*3/UL (ref 0–0.7)
IMM GRANULOCYTES NFR BLD AUTO: 0.2 % (ref 0–0.9)
LYMPHOCYTES # BLD AUTO: 2.81 X10*3/UL (ref 1.2–4.8)
LYMPHOCYTES NFR BLD AUTO: 48.2 %
MAGNESIUM SERPL-MCNC: 1.97 MG/DL (ref 1.6–2.4)
MCH RBC QN AUTO: 31.1 PG (ref 26–34)
MCHC RBC AUTO-ENTMCNC: 32.6 G/DL (ref 32–36)
MCV RBC AUTO: 95 FL (ref 80–100)
MONOCYTES # BLD AUTO: 0.38 X10*3/UL (ref 0.1–1)
MONOCYTES NFR BLD AUTO: 6.5 %
NEUTROPHILS # BLD AUTO: 2.42 X10*3/UL (ref 1.2–7.7)
NEUTROPHILS NFR BLD AUTO: 41.5 %
NRBC BLD-RTO: 0 /100 WBCS (ref 0–0)
PLATELET # BLD AUTO: 179 X10*3/UL (ref 150–450)
PMV BLD AUTO: 11.9 FL (ref 7.5–11.5)
POTASSIUM SERPL-SCNC: 3.8 MMOL/L (ref 3.5–5.3)
RBC # BLD AUTO: 3.7 X10*6/UL (ref 4–5.2)
SODIUM SERPL-SCNC: 142 MMOL/L (ref 136–145)
WBC # BLD AUTO: 5.8 X10*3/UL (ref 4.4–11.3)

## 2023-10-06 PROCEDURE — 99254 IP/OBS CNSLTJ NEW/EST MOD 60: CPT | Performed by: INTERNAL MEDICINE

## 2023-10-06 PROCEDURE — 85025 COMPLETE CBC W/AUTO DIFF WBC: CPT | Performed by: INTERNAL MEDICINE

## 2023-10-06 PROCEDURE — 93306 TTE W/DOPPLER COMPLETE: CPT

## 2023-10-06 PROCEDURE — G0378 HOSPITAL OBSERVATION PER HR: HCPCS

## 2023-10-06 PROCEDURE — 2500000004 HC RX 250 GENERAL PHARMACY W/ HCPCS (ALT 636 FOR OP/ED): Performed by: INTERNAL MEDICINE

## 2023-10-06 PROCEDURE — 36415 COLL VENOUS BLD VENIPUNCTURE: CPT | Performed by: INTERNAL MEDICINE

## 2023-10-06 PROCEDURE — 80048 BASIC METABOLIC PNL TOTAL CA: CPT | Performed by: INTERNAL MEDICINE

## 2023-10-06 PROCEDURE — 93016 CV STRESS TEST SUPVJ ONLY: CPT | Performed by: STUDENT IN AN ORGANIZED HEALTH CARE EDUCATION/TRAINING PROGRAM

## 2023-10-06 PROCEDURE — 93306 TTE W/DOPPLER COMPLETE: CPT | Performed by: STUDENT IN AN ORGANIZED HEALTH CARE EDUCATION/TRAINING PROGRAM

## 2023-10-06 PROCEDURE — 2500000002 HC RX 250 W HCPCS SELF ADMINISTERED DRUGS (ALT 637 FOR MEDICARE OP, ALT 636 FOR OP/ED): Performed by: INTERNAL MEDICINE

## 2023-10-06 PROCEDURE — 2500000001 HC RX 250 WO HCPCS SELF ADMINISTERED DRUGS (ALT 637 FOR MEDICARE OP): Performed by: STUDENT IN AN ORGANIZED HEALTH CARE EDUCATION/TRAINING PROGRAM

## 2023-10-06 PROCEDURE — 99239 HOSP IP/OBS DSCHRG MGMT >30: CPT | Performed by: INTERNAL MEDICINE

## 2023-10-06 PROCEDURE — 83735 ASSAY OF MAGNESIUM: CPT | Performed by: INTERNAL MEDICINE

## 2023-10-06 PROCEDURE — 2500000001 HC RX 250 WO HCPCS SELF ADMINISTERED DRUGS (ALT 637 FOR MEDICARE OP): Performed by: INTERNAL MEDICINE

## 2023-10-06 RX ORDER — SPIRONOLACTONE 25 MG/1
12.5 TABLET ORAL DAILY
Qty: 38 TABLET | Refills: 1 | Status: SHIPPED | OUTPATIENT
Start: 2023-10-06 | End: 2023-10-06 | Stop reason: SDUPTHER

## 2023-10-06 RX ORDER — OMEPRAZOLE 20 MG/1
40 TABLET, DELAYED RELEASE ORAL DAILY
Qty: 30 TABLET | Refills: 1 | Status: SHIPPED | OUTPATIENT
Start: 2023-10-06 | End: 2023-10-24 | Stop reason: WASHOUT

## 2023-10-06 RX ORDER — SPIRONOLACTONE 25 MG/1
12.5 TABLET ORAL DAILY
Qty: 30 TABLET | Refills: 1 | Status: SHIPPED | OUTPATIENT
Start: 2023-10-06 | End: 2023-10-24 | Stop reason: WASHOUT

## 2023-10-06 RX ORDER — SPIRONOLACTONE 25 MG/1
12.5 TABLET ORAL DAILY
Qty: 15 TABLET | Refills: 1 | Status: SHIPPED | OUTPATIENT
Start: 2023-10-06 | End: 2023-10-24 | Stop reason: WASHOUT

## 2023-10-06 RX ADMIN — OXYBUTYNIN CHLORIDE 5 MG: 5 TABLET ORAL at 09:22

## 2023-10-06 RX ADMIN — CYANOCOBALAMIN TAB 500 MCG 500 MCG: 500 TAB at 09:22

## 2023-10-06 RX ADMIN — CARVEDILOL 25 MG: 25 TABLET, FILM COATED ORAL at 09:22

## 2023-10-06 RX ADMIN — CARVEDILOL 25 MG: 25 TABLET, FILM COATED ORAL at 16:44

## 2023-10-06 RX ADMIN — RANOLAZINE 500 MG: 500 TABLET, FILM COATED, EXTENDED RELEASE ORAL at 06:43

## 2023-10-06 RX ADMIN — LEVOTHYROXINE SODIUM 25 MCG: 0.03 TABLET ORAL at 05:13

## 2023-10-06 RX ADMIN — HUMAN ALBUMIN MICROSPHERES AND PERFLUTREN 0.5 ML: 10; .22 INJECTION, SOLUTION INTRAVENOUS at 09:04

## 2023-10-06 RX ADMIN — SPIRONOLACTONE 25 MG: 25 TABLET, FILM COATED ORAL at 09:22

## 2023-10-06 RX ADMIN — GABAPENTIN 800 MG: 400 CAPSULE ORAL at 09:22

## 2023-10-06 RX ADMIN — PANTOPRAZOLE SODIUM 40 MG: 40 TABLET, DELAYED RELEASE ORAL at 09:22

## 2023-10-06 RX ADMIN — RANOLAZINE 500 MG: 500 TABLET, FILM COATED, EXTENDED RELEASE ORAL at 16:45

## 2023-10-06 RX ADMIN — ISOSORBIDE MONONITRATE 30 MG: 30 TABLET, EXTENDED RELEASE ORAL at 09:22

## 2023-10-06 SDOH — ECONOMIC STABILITY: FOOD INSECURITY: WITHIN THE PAST 12 MONTHS, THE FOOD YOU BOUGHT JUST DIDN'T LAST AND YOU DIDN'T HAVE MONEY TO GET MORE.: NEVER TRUE

## 2023-10-06 SDOH — ECONOMIC STABILITY: FOOD INSECURITY: WITHIN THE PAST 12 MONTHS, YOU WORRIED THAT YOUR FOOD WOULD RUN OUT BEFORE YOU GOT MONEY TO BUY MORE.: NEVER TRUE

## 2023-10-06 SDOH — ECONOMIC STABILITY: INCOME INSECURITY: IN THE PAST 12 MONTHS, HAS THE ELECTRIC, GAS, OIL, OR WATER COMPANY THREATENED TO SHUT OFF SERVICE IN YOUR HOME?: NO

## 2023-10-06 ASSESSMENT — PAIN - FUNCTIONAL ASSESSMENT: PAIN_FUNCTIONAL_ASSESSMENT: 0-10

## 2023-10-06 ASSESSMENT — PAIN SCALES - GENERAL: PAINLEVEL_OUTOF10: 0 - NO PAIN

## 2023-10-06 NOTE — PROGRESS NOTES
10/06/23 0933   Discharge Planning   Living Arrangements Spouse/significant other   Support Systems Spouse/significant other   Assistance Needed Independnent   Type of Residence Private residence   Home or Post Acute Services None   Patient expects to be discharged to: Home   Does the patient need discharge transport arranged? No     Patient plans to return home with no needs upon discharge.

## 2023-10-06 NOTE — DISCHARGE SUMMARY
Discharge Diagnosis  Chest pain    Issues Requiring Follow-Up  Outpatient follow-up with cardiology.  They will call her early next week to set up for follow-up and further testing if needed    Discharge Meds     Your medication list        CHANGE how you take these medications        Instructions Last Dose Given Next Dose Due   omeprazole OTC 20 mg EC tablet  Commonly known as: PriLOSEC OTC  What changed: how much to take      Take 2 tablets (40 mg) by mouth once daily.       spironolactone 25 mg tablet  Commonly known as: Aldactone  What changed: Another medication with the same name was changed. Make sure you understand how and when to take each.      Take 0.5 tablets (12.5 mg) by mouth once daily.       spironolactone 25 mg tablet  Commonly known as: Aldactone  What changed: how much to take      Take 0.5 tablets (12.5 mg) by mouth once daily.              CONTINUE taking these medications        Instructions Last Dose Given Next Dose Due   BIOTIN ORAL           calcium carbonate 200 mg calcium chewable tablet  Commonly known as: Tums           carvedilol 25 mg tablet  Commonly known as: Coreg           cholecalciferol 50 mcg (2,000 unit) capsule  Commonly known as: Vitamin D-3           DULoxetine 60 mg DR capsule  Commonly known as: Cymbalta           gabapentin 800 mg tablet  Commonly known as: Neurontin           isosorbide mononitrate ER 30 mg 24 hr tablet  Commonly known as: Imdur           levothyroxine 25 mcg tablet  Commonly known as: Synthroid, Levoxyl           losartan 50 mg tablet  Commonly known as: Cozaar           MULTIVITAMIN ORAL           ProAir HFA 90 mcg/actuation inhaler  Generic drug: albuterol           ranolazine 500 mg 12 hr tablet  Commonly known as: Ranexa           Symbicort 80-4.5 mcg/actuation inhaler  Generic drug: budesonide-formoteroL           VITAMIN B-12 ORAL           Xarelto 10 mg tablet  Generic drug: rivaroxaban                  STOP taking these medications       oxybutynin 5 mg tablet  Commonly known as: Ditropan                  Where to Get Your Medications        These medications were sent to [facility] - [place], [place] - [address]  [address], [facility] [address]      Phone: [phone]   omeprazole OTC 20 mg EC tablet  spironolactone 25 mg tablet  spironolactone 25 mg tablet         Test Results Pending At Discharge  Pending Labs       Order Current Status    Extra Tubes In process    Lavender Top In process            Hospital Course   Barbi Karimi is a 47 y.o. female with past medical history of congenital heart block leading to pacemaker at age 16, hypertension, fibromyalgia, gastric sleeve procedure presented to the emergency room with complaints of chest pain.  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  States it was hot and so she sat down and actually even lay down outside of the trailer.  Patient became ashen short winded but not diaphoretic.  Patient's smart watch noted that her heart rate was 130 at that time.  Patient was brought into the emergency room where patient was pain-free upon arrival..  Patient was noted to be relatively hypotensive in the emergency room with systolic blood pressure dropping as low as 90.  Blood work upon arrival emergency room showed BMP significant only for a bicarb of 20 anion gap is only 14.  Patient's troponin was 3 with a repeat of 4.  Hemoglobin is 11.2..  Patient has chest x-ray with out infiltrate or CHF.  Patient is being admitted to the hospital for observation.  Cardiology consultation to be obtained consideration of stress testing.  Patient has reportedly pacer dependent rhythm although on telemetry monitoring it appears that she actually has a sinus rhythm at times.       Past medical history   She has a past medical history of Personal history of gestational diabetes and Personal history of other  diseases of the musculoskeletal system and connective tissue.     Surgical History  She has a past surgical history that includes Cardiac pacemaker placement (08/11/2016); Other surgical history (08/11/2016); Cholecystectomy (08/11/2016); Hysterectomy (08/11/2016); and Insert / replace / remove pacemaker (08/11/2016).  Gastric sleeve procedure     Social History  No smoking no alcohol and no illicit drug use     Family History  Family History               Family History   Problem Relation Name Age of Onset    Crohn's disease Mother        Hypertension Mother        Lung cancer Mother        Thyroid disease Mother        Diabetes Father        Hypertension Father        Thyroid disease Father                Allergies  Patient has no known allergies.     10/5: Patient seen at bedside and states her chest pain and shortness of breath have improved today. 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 She eventually went to walk to the bathroom with her  and she felt pressure on her chest and had difficulty taking a deep breath in. The patient has asthma but reports this did not feel like it was due to her asthma. Patient was hypotensive in the emergency room ranging from  systolic. Patient has a history of congestive heart failure and third-degree heart block. She has had a pacemaker since she was 16 years old. She also recently had gastric sleeve surgery and lost 80 pounds. Adjust blood pressure medications. Patient will need further cardiac work-up. Consult cardiology.            10/6: Awaiting stress test results if negative will discharge home with outpatient follow-up Case discussed with cardiology.    Unable to do stress test today due to technical reasons.  Low suspicion for ACS outpatient follow-up for thing Monday.  Return if symptoms recur.    Pertinent Physical Exam At Time of Discharge  Physical Exam    Outpatient Follow-Up  Future  Appointments   Date Time Provider Department Center   3/8/2024 10:00 AM Cierra Sanchez MD [id number]         Nixon Benedict MD

## 2023-10-06 NOTE — PROGRESS NOTES
Barbi Karimi is a 47 y.o. female on day 1 of admission presenting with Chest pain.      Subjective   History Of Present Illness  Barbi Karimi is a 47 y.o. female with past medical history of congenital heart block leading to pacemaker at age 16, hypertension, fibromyalgia, gastric sleeve procedure presented to the emergency room with complaints of chest pain.  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  States it was hot and so she sat down and actually even lay down outside of the trailer.  Patient became ashen short winded but not diaphoretic.  Patient's smart watch noted that her heart rate was 130 at that time.  Patient was brought into the emergency room where patient was pain-free upon arrival..  Patient was noted to be relatively hypotensive in the emergency room with systolic blood pressure dropping as low as 90.  Blood work upon arrival emergency room showed BMP significant only for a bicarb of 20 anion gap is only 14.  Patient's troponin was 3 with a repeat of 4.  Hemoglobin is 11.2..  Patient has chest x-ray with out infiltrate or CHF.  Patient is being admitted to the hospital for observation.  Cardiology consultation to be obtained consideration of stress testing.  Patient has reportedly pacer dependent rhythm although on telemetry monitoring it appears that she actually has a sinus rhythm at times.       Past medical history   She has a past medical history of Personal history of gestational diabetes and Personal history of other diseases of the musculoskeletal system and connective tissue.     Surgical History  She has a past surgical history that includes Cardiac pacemaker placement (08/11/2016); Other surgical history (08/11/2016); Cholecystectomy (08/11/2016); Hysterectomy (08/11/2016); and Insert / replace / remove pacemaker (08/11/2016).  Gastric sleeve procedure     Social History  No smoking no alcohol and no illicit  drug use     Family History  Family History          Family History   Problem Relation Name Age of Onset    Crohn's disease Mother        Hypertension Mother        Lung cancer Mother        Thyroid disease Mother        Diabetes Father        Hypertension Father        Thyroid disease Father                Allergies  Patient has no known allergies.    10/5: Patient seen at bedside and states her chest pain and shortness of breath have improved today. The patient reports being lightheaded when she was working at a [employer] stand so she sat down. She eventually went to walk to the bathroom with her  and she felt pressure on her chest and had difficulty taking a deep breath in. The patient has asthma but reports this did not feel like it was due to her asthma. Patient was hypotensive in the emergency room ranging from  systolic. Patient has a history of congestive heart failure and third-degree heart block. She has had a pacemaker since she was 16 years old. She also recently had gastric sleeve surgery and lost 80 pounds. Adjust blood pressure medications. Patient will need further cardiac work-up. Consult cardiology.        10/6: Awaiting stress test results if negative will discharge home with outpatient follow-up Case discussed with cardiology.  Objective     Last Recorded Vitals  /62 (BP Location: Right arm, Patient Position: Lying)   Pulse 60   Temp 36.2 °C (97.2 °F) (Temporal)   Resp 16   Wt 96.6 kg (213 lb)   SpO2 94%   Intake/Output last 3 Shifts:  No intake or output data in the 24 hours ending 10/06/23 1724    Admission Weight  Weight: 97.5 kg (215 lb) (10/04/23 2000)    Daily Weight  10/06/23 : 96.6 kg (213 lb)    Image Results  Cardiology Interpretation Of Nuclear Stress - See Other Report For Nuclear Portion                [place] [id number]       Phone [phone] Fax [phone]    Nuclear Treadmill Stress Test    Patient  Name:      SADAF HIGGINS    Ordering Provider:     [id number] LÓPEZ PERDOMO  Study Date:        10/6/2023           Reading Physician:     [id number] Romero MD  MRN/PID:           [id number]            Supervising Physician: [id number] Romero MD  Accession#:        [id number]        Fellow:  Date of Birth/Age: 1975 / 47     Fellow:                     years  Gender:            F                   Nurse:                 Riri Jaramillo RN  Admission Status:  Inpatient           Sonographer:           NA  Height:            168.91 cm           Technologist:  Weight:            96.62 kg            Additional Staff:  BSA:               2.07 m2             Encounter#:            [id number]  BMI:               33.86 kg/m2         Patient Location:      [facility]    Study Type:    NUCLEAR STRESS TEST  Diagnosis/ICD: Other chest pain-R07.89  Indication:    Chest Pain    Falls Risk:     Study Details: Correct procedure and correct patient verified verbally and with                 ID Band checked.       Patient History: Pacemaker, LVD, DVT and congestive heart failure.     Medications: Xarelto, aldactone, protonix, inhalers, carvedilol, gabepentin, isosorbide, levothroxin. The patient took medications as prescribed.     Patient Performance: The patient exercised to stage III on a Jefferson protocol for 8 minutes and 18 seconds, achieving 10.1 METS. The peak heart rate achieved was 131 bpm, which was 77 % of the age predicted target heart rate of 170 bpm. The resting blood pressure was 112/93 mmHg with a heart rate of 65 bpm. The standing blood pressure was 116/88 mmHg with a heart rate of 69 bpm. The patient's functional capacity was average. The patient developed chest pain typical of angina during the stress exam. The blood pressure  response was normal. The test was terminated due to: fatigue. Patient has met the discharge criteria and is discharged to their floor.     70% maximum predicted heart rate (MPHR) is 119 bpm.  85% maximum predicted heart rate (MPHR) is 145 bpm.  100% maximum predicted heart rate (MPHR) is 170 bpm.  Peak Oxygen Use: 36-40 ml/kg/min.  Double Product (HR x BP): 170.  Exercise Capacity: 70% Achieved HR = 4.6 METS : 85% Achieved HR = 10.1 METS.       Baseline ECG: Resting ECG showed normal sinus rhythm with left axis deviation and left bundle branch block.     Stress ECG: No ST changes.     Stress Stage Data:  +-----------------+---+------+-------+-----------------+----+                   HR Sys BPDias BPRPE              METS  +-----------------+---+------+-------+-----------------+----+  Baseline Resting 65 112   93                            +-----------------+---+------+-------+-----------------+----+  Baseline Lendgsaz99 116   88                            +-----------------+---+------+-------+-----------------+----+  Stage I          [id number]   50                            +-----------------+---+------+-------+-----------------+----+  Stage II         [id number]   51                            +-----------------+---+------+-------+-----------------+----+  Stage III        [id number]   48     19=Very,very hard10.1  +-----------------+---+------+-------+-----------------+----+       +------------+---+------+-------+              HR Sys BPDias BP  +------------+---+------+-------+  Recovery I  [id number]   46       +------------+---+------+-------+  Recovery II 86                +------------+---+------+-------+  Recovery III78 149   81       +------------+---+------+-------+  Recovery IV 77 139   75       +------------+---+------+-------+       Summary:   1. No clinical evidence for ischemia at submaximal workload.   2. Non-diagnostic Stress Test.   3. Submaximal  level of stress achieved.    [id number] Marlon Romero MD  Electronically signed on 10/6/2023 at 4:38:35 PM            ** Final **      Physical Exam    Relevant Results  Scheduled medications  aspirin, 324 mg, oral, Once  carvedilol, 25 mg, oral, BID with meals  cyanocobalamin, 500 mcg, oral, Daily  DULoxetine, 120 mg, oral, Nightly  fluticasone furoate-vilanteroL, 1 puff, inhalation, Daily  gabapentin, 800 mg, oral, BID  isosorbide mononitrate ER, 30 mg, oral, Daily  levothyroxine, 25 mcg, oral, Daily  multivitamin with minerals, 1 tablet, oral, Daily  oxybutynin, 5 mg, oral, BID  pantoprazole, 40 mg, oral, Daily before breakfast  polyethylene glycol, 17 g, oral, Daily  ranolazine, 500 mg, oral, q12h  spironolactone, 25 mg, oral, Daily  sulfaSALAzine, 500 mg, oral, Daily      Continuous medications     PRN medications    Results for orders placed or performed during the hospital encounter of 10/04/23 (from the past 24 hour(s))   Troponin I, High Sensitivity   Result Value Ref Range    Troponin I, High Sensitivity 5 0 - 13 ng/L   Troponin I, High Sensitivity   Result Value Ref Range    Troponin I, High Sensitivity 4 0 - 13 ng/L   Troponin I, High Sensitivity   Result Value Ref Range    Troponin I, High Sensitivity 4 0 - 13 ng/L   Magnesium   Result Value Ref Range    Magnesium 1.97 1.60 - 2.40 mg/dL   Basic Metabolic Panel   Result Value Ref Range    Glucose 91 74 - 99 mg/dL    Sodium 142 136 - 145 mmol/L    Potassium 3.8 3.5 - 5.3 mmol/L    Chloride 109 (H) 98 - 107 mmol/L    Bicarbonate 28 21 - 32 mmol/L    Anion Gap 9 (L) 10 - 20 mmol/L    Urea Nitrogen 22 6 - 23 mg/dL    Creatinine 0.87 0.50 - 1.05 mg/dL    eGFR 82 >60 mL/min/1.73m*2    Calcium 9.3 8.6 - 10.3 mg/dL   CBC and Auto Differential   Result Value Ref Range    WBC 5.8 4.4 - 11.3 x10*3/uL    nRBC 0.0 0.0 - 0.0 /100 WBCs    RBC 3.70 (L) 4.00 - 5.20 x10*6/uL    Hemoglobin 11.5 (L) 12.0 - 16.0 g/dL    Hematocrit 35.3 (L) 36.0 - 46.0 %    MCV 95 80 - 100 fL     MCH 31.1 26.0 - 34.0 pg    MCHC 32.6 32.0 - 36.0 g/dL    RDW 13.0 11.5 - 14.5 %    Platelets 179 150 - 450 x10*3/uL    MPV 11.9 (H) 7.5 - 11.5 fL    Neutrophils % 41.5 40.0 - 80.0 %    Immature Granulocytes %, Automated 0.2 0.0 - 0.9 %    Lymphocytes % 48.2 13.0 - 44.0 %    Monocytes % 6.5 2.0 - 10.0 %    Eosinophils % 3.1 0.0 - 6.0 %    Basophils % 0.5 0.0 - 2.0 %    Neutrophils Absolute 2.42 1.20 - 7.70 x10*3/uL    Immature Granulocytes Absolute, Automated 0.01 0.00 - 0.70 x10*3/uL    Lymphocytes Absolute 2.81 1.20 - 4.80 x10*3/uL    Monocytes Absolute 0.38 0.10 - 1.00 x10*3/uL    Eosinophils Absolute 0.18 0.00 - 0.70 x10*3/uL    Basophils Absolute 0.03 0.00 - 0.10 x10*3/uL   Transthoracic Echo (TTE) Complete   Result Value Ref Range    BSA 2.13 m2                    Assessment/Plan   This patient currently has cardiac telemetry ordered; if you would like to modify or discontinue the telemetry order, click here to go to the orders activity to modify/discontinue the order.              Principal Problem:    Chest pain  Active Problems:    CHB (complete heart block) (CMS/HCC)    Hypotension    Cardiac work-up  Recheck EKG   treadmill nuclear stress in the a.m.  Regular echocardiogram in a.m.  Consult cardiology  Case discussed with cardiology Dr Sanchez  Adjust blood pressure medication  Will checks labs in AM  See orders for complete plan                  Nixon Benedict MD

## 2023-10-06 NOTE — PROGRESS NOTES
Barbi Karimi is a 47 y.o. female on day 1 of admission presenting with Chest pain.      Subjective   History Of Present Illness  Barbi Karimi is a 47 y.o. female with past medical history of congenital heart block leading to pacemaker at age 16, hypertension, fibromyalgia, gastric sleeve procedure presented to the emergency room with complaints of chest pain.  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  States it was hot and so she sat down and actually even lay down outside of the trailer.  Patient became ashen short winded but not diaphoretic.  Patient's smart watch noted that her heart rate was 130 at that time.  Patient was brought into the emergency room where patient was pain-free upon arrival..  Patient was noted to be relatively hypotensive in the emergency room with systolic blood pressure dropping as low as 90.  Blood work upon arrival emergency room showed BMP significant only for a bicarb of 20 anion gap is only 14.  Patient's troponin was 3 with a repeat of 4.  Hemoglobin is 11.2..  Patient has chest x-ray with out infiltrate or CHF.  Patient is being admitted to the hospital for observation.  Cardiology consultation to be obtained consideration of stress testing.  Patient has reportedly pacer dependent rhythm although on telemetry monitoring it appears that she actually has a sinus rhythm at times.       Past medical history   She has a past medical history of Personal history of gestational diabetes and Personal history of other diseases of the musculoskeletal system and connective tissue.     Surgical History  She has a past surgical history that includes Cardiac pacemaker placement (08/11/2016); Other surgical history (08/11/2016); Cholecystectomy (08/11/2016); Hysterectomy (08/11/2016); and Insert / replace / remove pacemaker (08/11/2016).  Gastric sleeve procedure     Social History  No smoking no alcohol and no illicit drug  use     Family History  Family History          Family History   Problem Relation Name Age of Onset    Crohn's disease Mother        Hypertension Mother        Lung cancer Mother        Thyroid disease Mother        Diabetes Father        Hypertension Father        Thyroid disease Father                Allergies  Patient has no known allergies.    10/5: Patient seen at bedside and states her chest pain and shortness of breath have improved today. The patient reports being lightheaded when she was working at a [employer] stand so she sat down. She eventually went to walk to the bathroom with her  and she felt pressure on her chest and had difficulty taking a deep breath in. The patient has asthma but reports this did not feel like it was due to her asthma. Patient was hypotensive in the emergency room ranging from  systolic. Patient has a history of congestive heart failure and third-degree heart block. She has had a pacemaker since she was 16 years old. She also recently had gastric sleeve surgery and lost 80 pounds. Adjust blood pressure medications. Patient will need further cardiac work-up. Consult cardiology.     10/6: Patient seen at bedside and seems to be feeling well today. She was up out of bed. She denies chest pain, abdominal pain or any acute changes. Follow EKG, treadmill nuclear stress test and echocardiogram. Continue following patients blood pressure. Continue to monitor patient.        Objective     Last Recorded Vitals  /62 (BP Location: Right arm, Patient Position: Lying)   Pulse 60   Temp 36.2 °C (97.2 °F) (Temporal)   Resp 16   Wt 96.6 kg (213 lb)   SpO2 94%   Intake/Output last 3 Shifts:  No intake or output data in the 24 hours ending 10/06/23 1141    Admission Weight  Weight: 97.5 kg (215 lb) (10/04/23 2000)    Daily Weight  10/06/23 : 96.6 kg (213 lb)    Image Results  XR chest 1 view  Narrative: STUDY:  Chest Radiograph;  10/04/2023 9:42 PM   INDICATION:  Chest  pain.  COMPARISON:  XR chest 12/05/2022, 05/28/2021.   ACCESSION NUMBER(S):  [id number]  ORDERING CLINICIAN:  DEBBY PERRY  TECHNIQUE:  Frontal chest was obtained at 2130 hours.  FINDINGS:  CARDIOMEDIASTINAL SILHOUETTE:  Cardiomediastinal silhouette is normal in size and configuration.  Pacer leads are noted within the heart which are unchanged position.     LUNGS:  Lungs are clear.     ABDOMEN:  No remarkable upper abdominal findings.     BONES:  No acute osseous changes.  Impression: No acute cardiopulmonary disease..  Signed by Marbin Babb MD      Physical Exam    Relevant Results  Scheduled medications  aspirin, 324 mg, oral, Once  carvedilol, 25 mg, oral, BID with meals  cyanocobalamin, 500 mcg, oral, Daily  DULoxetine, 120 mg, oral, Nightly  fluticasone furoate-vilanteroL, 1 puff, inhalation, Daily  gabapentin, 800 mg, oral, BID  isosorbide mononitrate ER, 30 mg, oral, Daily  levothyroxine, 25 mcg, oral, Daily  multivitamin with minerals, 1 tablet, oral, Daily  oxybutynin, 5 mg, oral, BID  pantoprazole, 40 mg, oral, Daily before breakfast  polyethylene glycol, 17 g, oral, Daily  ranolazine, 500 mg, oral, q12h  spironolactone, 25 mg, oral, Daily  sulfaSALAzine, 500 mg, oral, Daily      Continuous medications     PRN medications    Results for orders placed or performed during the hospital encounter of 10/04/23 (from the past 24 hour(s))   Troponin I, High Sensitivity   Result Value Ref Range    Troponin I, High Sensitivity 5 0 - 13 ng/L   Troponin I, High Sensitivity   Result Value Ref Range    Troponin I, High Sensitivity 4 0 - 13 ng/L   Troponin I, High Sensitivity   Result Value Ref Range    Troponin I, High Sensitivity 4 0 - 13 ng/L   Magnesium   Result Value Ref Range    Magnesium 1.97 1.60 - 2.40 mg/dL   Basic Metabolic Panel   Result Value Ref Range    Glucose 91 74 - 99 mg/dL    Sodium 142 136 - 145 mmol/L    Potassium 3.8 3.5 - 5.3 mmol/L    Chloride 109 (H) 98 - 107 mmol/L    Bicarbonate 28  21 - 32 mmol/L    Anion Gap 9 (L) 10 - 20 mmol/L    Urea Nitrogen 22 6 - 23 mg/dL    Creatinine 0.87 0.50 - 1.05 mg/dL    eGFR 82 >60 mL/min/1.73m*2    Calcium 9.3 8.6 - 10.3 mg/dL   CBC and Auto Differential   Result Value Ref Range    WBC 5.8 4.4 - 11.3 x10*3/uL    nRBC 0.0 0.0 - 0.0 /100 WBCs    RBC 3.70 (L) 4.00 - 5.20 x10*6/uL    Hemoglobin 11.5 (L) 12.0 - 16.0 g/dL    Hematocrit 35.3 (L) 36.0 - 46.0 %    MCV 95 80 - 100 fL    MCH 31.1 26.0 - 34.0 pg    MCHC 32.6 32.0 - 36.0 g/dL    RDW 13.0 11.5 - 14.5 %    Platelets 179 150 - 450 x10*3/uL    MPV 11.9 (H) 7.5 - 11.5 fL    Neutrophils % 41.5 40.0 - 80.0 %    Immature Granulocytes %, Automated 0.2 0.0 - 0.9 %    Lymphocytes % 48.2 13.0 - 44.0 %    Monocytes % 6.5 2.0 - 10.0 %    Eosinophils % 3.1 0.0 - 6.0 %    Basophils % 0.5 0.0 - 2.0 %    Neutrophils Absolute 2.42 1.20 - 7.70 x10*3/uL    Immature Granulocytes Absolute, Automated 0.01 0.00 - 0.70 x10*3/uL    Lymphocytes Absolute 2.81 1.20 - 4.80 x10*3/uL    Monocytes Absolute 0.38 0.10 - 1.00 x10*3/uL    Eosinophils Absolute 0.18 0.00 - 0.70 x10*3/uL    Basophils Absolute 0.03 0.00 - 0.10 x10*3/uL   Transthoracic Echo (TTE) Complete   Result Value Ref Range    BSA 2.13 m2                    Assessment/Plan   This patient currently has cardiac telemetry ordered; if you would like to modify or discontinue the telemetry order, click here to go to the orders activity to modify/discontinue the order.              Principal Problem:    Chest pain  Active Problems:    CHB (complete heart block) (CMS/HCC)    Hypotension    Cardiac work-up  Recheck EKG   treadmill nuclear stress in the a.m.  Regular echocardiogram in a.m.  Consult cardiology  Case discussed with cardiology Dr Sanchez  Adjust blood pressure medication  Will checks labs in AM  See orders for complete plan                  Meredith Canales
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
## 2023-10-11 ENCOUNTER — HOSPITAL ENCOUNTER (OUTPATIENT)
Dept: RADIOLOGY | Facility: HOSPITAL | Age: 48
Discharge: HOME | End: 2023-10-11
Payer: COMMERCIAL

## 2023-10-11 ENCOUNTER — HOSPITAL ENCOUNTER (OUTPATIENT)
Dept: CARDIOLOGY | Facility: HOSPITAL | Age: 48
Discharge: HOME | End: 2023-10-11
Payer: COMMERCIAL

## 2023-10-11 DIAGNOSIS — R07.9 CHEST PAIN, UNSPECIFIED: ICD-10-CM

## 2023-10-11 DIAGNOSIS — R07.9 CHEST PAIN: ICD-10-CM

## 2023-10-11 PROCEDURE — 2500000004 HC RX 250 GENERAL PHARMACY W/ HCPCS (ALT 636 FOR OP/ED): Performed by: INTERNAL MEDICINE

## 2023-10-11 PROCEDURE — A9502 TC99M TETROFOSMIN: HCPCS | Performed by: INTERNAL MEDICINE

## 2023-10-11 PROCEDURE — 3430000001 HC RX 343 DIAGNOSTIC RADIOPHARMACEUTICALS: Performed by: INTERNAL MEDICINE

## 2023-10-11 PROCEDURE — 93016 CV STRESS TEST SUPVJ ONLY: CPT | Performed by: INTERNAL MEDICINE

## 2023-10-11 RX ORDER — REGADENOSON 0.08 MG/ML
0.4 INJECTION, SOLUTION INTRAVENOUS ONCE
Status: COMPLETED | OUTPATIENT
Start: 2023-10-11 | End: 2023-10-11

## 2023-10-11 RX ADMIN — TETROFOSMIN 10 MILLICURIE: 0.23 INJECTION, POWDER, LYOPHILIZED, FOR SOLUTION INTRAVENOUS at 13:41

## 2023-10-11 RX ADMIN — TETROFOSMIN 30 MILLICURIE: 0.23 INJECTION, POWDER, LYOPHILIZED, FOR SOLUTION INTRAVENOUS at 13:41

## 2023-10-11 RX ADMIN — REGADENOSON 0.4 MG: 0.08 INJECTION, SOLUTION INTRAVENOUS at 11:13

## 2023-10-12 PROCEDURE — 78452 HT MUSCLE IMAGE SPECT MULT: CPT | Performed by: INTERNAL MEDICINE

## 2023-10-12 NOTE — TELEPHONE ENCOUNTER
10/12/23  1200  Called nuclear portion of stress test results to patient with patient verbalizing understanding.

## 2023-10-23 ENCOUNTER — APPOINTMENT (OUTPATIENT)
Dept: HEMATOLOGY/ONCOLOGY | Facility: CLINIC | Age: 48
End: 2023-10-23
Payer: COMMERCIAL

## 2023-10-23 PROBLEM — R07.89 ATYPICAL CHEST PAIN: Status: ACTIVE | Noted: 2023-10-23

## 2023-10-23 PROBLEM — I50.22 CHRONIC SYSTOLIC (CONGESTIVE) HEART FAILURE (MULTI): Status: ACTIVE | Noted: 2023-10-23

## 2023-10-23 PROBLEM — F43.10 PTSD (POST-TRAUMATIC STRESS DISORDER): Status: ACTIVE | Noted: 2022-11-11

## 2023-10-23 RX ORDER — ESTRADIOL 0.1 MG/G
CREAM VAGINAL
COMMUNITY
Start: 2023-04-17 | End: 2023-10-24 | Stop reason: WASHOUT

## 2023-10-23 RX ORDER — ESCITALOPRAM OXALATE 10 MG/1
10 TABLET ORAL
COMMUNITY
End: 2023-10-24 | Stop reason: WASHOUT

## 2023-10-23 RX ORDER — ATOMOXETINE 60 MG/1
100 CAPSULE ORAL DAILY
COMMUNITY
Start: 2023-10-20

## 2023-10-23 RX ORDER — OXYBUTYNIN CHLORIDE 5 MG/1
TABLET, EXTENDED RELEASE ORAL
COMMUNITY
Start: 2023-06-01 | End: 2024-03-08 | Stop reason: WASHOUT

## 2023-10-23 RX ORDER — ETANERCEPT 50 MG/ML
50 SOLUTION SUBCUTANEOUS
COMMUNITY
Start: 2023-08-18

## 2023-10-23 RX ORDER — PSEUDOEPHEDRINE HCL 30 MG
500 TABLET ORAL 3 TIMES DAILY
COMMUNITY
Start: 2023-03-06

## 2023-10-23 RX ORDER — NYSTATIN 100000 [USP'U]/G
POWDER TOPICAL
COMMUNITY
Start: 2023-07-28

## 2023-10-23 RX ORDER — OMEPRAZOLE 40 MG/1
CAPSULE, DELAYED RELEASE ORAL
COMMUNITY
Start: 2023-08-24 | End: 2023-10-24 | Stop reason: WASHOUT

## 2023-10-23 ASSESSMENT — ENCOUNTER SYMPTOMS
VOMITING: 0
COUGH: 0
FEVER: 0
HEMATOCHEZIA: 0
SYNCOPE: 0
NEAR-SYNCOPE: 0
NAUSEA: 0
IRREGULAR HEARTBEAT: 0
SHORTNESS OF BREATH: 0
ALTERED MENTAL STATUS: 0
WHEEZING: 0
DYSPNEA ON EXERTION: 0
HEMATURIA: 0
ORTHOPNEA: 0
CHILLS: 0
PALPITATIONS: 0

## 2023-10-23 NOTE — PROGRESS NOTES
Chief Complaint/Reason for Visit:  Hospital Follow-up s/p hospitalization cardiovascular follow up    History Of Present Illness:    Barbi Karimi is a 47 y.o. female that presents to the office for s/p hospitalization follow up.  Taking medications as prescribed.     During last office visit her spironolactone dose was reduced d/t lightheadedness and weight loss S/p gastric sleeve March 2023.  She was admitted to [facility] 10/4/23-10/6/23 for lightheadedness, chest heaviness and tachycardia.  she was working at a [employer] when she felt dizziness acutely - with some associated heaviness in her chest.  HR on her fitness watch was in the 130's.  She initially thought this may be 2/2 dehydration or hypoglycemia - drank some water and ate something, and symptoms seemed to resolve after 30 mins.  The folowing night again at the [employer] she had lightheadness -  was at the game and told her she looked pale.  HR at the time 70's.  She was walking with her  when she felt severe sudden onset chest heaviness prompting him to call 911.    HSTI 4 > 3 > 5 > 4.    Stress test with low probability of ischemia and echo with LVEF 40-45% which is a chronic finding.      After hospitalization she stopped carvedilol, spironolactone, isosorbide and losartan on her own as she noticed after taking them that she was dizzy.  She has been monitoring her BP at home and typically gets 120/70-80's.  She has been feeling significantly better and denies any lightheadedness or chest heaviness.     Past Medical History:  She has a past medical history of Personal history of gestational diabetes and Personal history of other diseases of the musculoskeletal system and connective tissue.    Past Surgical History:  She has a past surgical history that includes Cardiac pacemaker placement (08/11/2016); Other surgical history (08/11/2016); Cholecystectomy (08/11/2016); Hysterectomy (08/11/2016); and  Insert / replace / remove pacemaker (08/11/2016).      Social History:  She reports that she has never smoked. She has never used smokeless tobacco. No history on file for alcohol use and drug use.    Family History:  Family History   Problem Relation Name Age of Onset    Crohn's disease Mother      Hypertension Mother      Lung cancer Mother      Thyroid disease Mother      Diabetes Father      Hypertension Father      Thyroid disease Father          Allergies:  Aspirin; Mold; Allerg xt,d.farinae-d.pteronys; and Epinephrine    Review of Systems   Constitutional: Negative for chills and fever.   Cardiovascular:  Negative for chest pain, dyspnea on exertion, irregular heartbeat, leg swelling, near-syncope, orthopnea, palpitations and syncope.   Respiratory:  Negative for cough, shortness of breath and wheezing.    Musculoskeletal:  Positive for arthritis (fibromyalgia and arthritis).   Gastrointestinal:  Negative for hematochezia, melena, nausea and vomiting.   Genitourinary:  Negative for hematuria.   Psychiatric/Behavioral:  Negative for altered mental status.      Objective      Vitals reviewed.   Constitutional:       Appearance: Not in distress.   Pulmonary:      Effort: Pulmonary effort is normal.      Breath sounds: Normal breath sounds.   Cardiovascular:      PMI at left midclavicular line. Normal rate. Regular rhythm. S1 with normal intensity. S2 with normal intensity.       Murmurs: There is no murmur.   Edema:     Peripheral edema absent.   Abdominal:      General: Bowel sounds are normal.   Neurological:      Mental Status: Alert and oriented to person, place and time.         Current Outpatient Medications   Medication Instructions    albuterol (ProAir HFA) 90 mcg/actuation inhaler 2 puffs, inhalation, Every 6 hours PRN    atomoxetine (STRATTERA) 60 mg, oral    BIOTIN ORAL 1 capsule, oral, Daily    budesonide-formoteroL (Symbicort) 80-4.5 mcg/actuation inhaler 2 puffs, inhalation, 2 times daily, RINSE  MOUTH AFTER USE     calcium citrate 500 mg, oral, 3 times daily    cholecalciferol (VITAMIN D-3) 50 mcg, oral, Daily    cyanocobalamin, vitamin B-12, (VITAMIN B-12 ORAL) 1 tablet, oral, Daily    DULoxetine (Cymbalta) 60 mg DR capsule 2 tablets, oral, Daily    EnbreL Mini 50 mg, subcutaneous    gabapentin (Neurontin) 800 mg tablet 1 tablet, oral, 2 times daily    levothyroxine (Synthroid, Levoxyl) 25 mcg tablet 1 tablet, oral, Daily    MULTIVITAMIN ORAL 1 tablet, oral, Daily    nystatin (Mycostatin) 100,000 unit/gram powder Apply topically to affected area two times daily.    oxybutynin XL (Ditropan-XL) 5 mg 24 hr tablet     ranolazine (RANEXA) 500 mg, oral, Every 12 hours        Last Labs:  CBC -  Lab Results   Component Value Date    WBC 5.8 10/06/2023    HGB 11.5 (L) 10/06/2023    HCT 35.3 (L) 10/06/2023    MCV 95 10/06/2023     10/06/2023       CMP -  Lab Results   Component Value Date    CALCIUM 9.3 10/06/2023    PHOS 3.7 05/29/2021    PROT 6.2 (L) 10/04/2023    ALBUMIN 3.8 10/04/2023    AST 19 10/04/2023    ALT 18 10/04/2023    ALKPHOS 74 10/04/2023    BILITOT 0.5 10/04/2023       LIPID PANEL -   Lab Results   Component Value Date    CHOL 181 11/25/2019    TRIG 94 11/25/2019    HDL 44.1 11/25/2019    CHHDL 4.1 11/25/2019    LDLF 118 (H) 11/25/2019    VLDL 19 11/25/2019       RENAL FUNCTION PANEL -   Lab Results   Component Value Date    GLUCOSE 91 10/06/2023     10/06/2023    K 3.8 10/06/2023     (H) 10/06/2023    CO2 28 10/06/2023    ANIONGAP 9 (L) 10/06/2023    BUN 22 10/06/2023    CREATININE 0.87 10/06/2023    CALCIUM 9.3 10/06/2023    PHOS 3.7 05/29/2021    ALBUMIN 3.8 10/04/2023        Lab Results   Component Value Date    HGBA1C 5.1 05/15/2023       No results found for this or any previous visit.     Last Cardiology Tests:    Echo 10/6/23:   1. Left ventricular systolic function is mildly decreased with a 40-45% estimated ejection fraction.   2. Multiple segmental abnormalities exist.  "See findings.   3. Spectral Doppler shows an impaired relaxation pattern of left ventricular diastolic filling.   4. There is mildly reduced right ventricular systolic function.   5. RVSP within normal limits.   6. There are multiple wall motion abnormalities.    [facility] 2/10/22:    1. No significant CAD. Consider microvascular disease.   2. No significant LV-AO peak to peak pullback gradient.     Stress test 10/11/23:   Summary:   1. Indeterminate due to presence of nonspecific IVCD on baseline EKG.   2. Indeterminate Stress Test.   3. No clinical evidence for ischemia at maximal infusion.   4. Correlate with myocardial perfusion imaging results.   5. Nuclear image results are reported separately.    There is a small, moderate intensity, fixed perfusion defect in the  apical/inferoapical wall with associated wall motion abnormality  consistent with prior infarct. There is a small to moderate size,  mild intensity, fixed perfusion defect in the inferior wall. No  reversible perfusion defects seen.      Calculated ejection fraction of 43% with apical akinesis.    1. Fixed perfusion defects as noted above. No reversible perfusion  defects seen. There is a low probability of ischemia.  2. Calculated ejection fraction of 43% with apical akinesis.    Visit Vitals  /80 (BP Location: Right arm, Patient Position: Sitting, BP Cuff Size: Adult)   Pulse 74   Ht 1.689 m (5' 6.5\")   Wt 96.6 kg (213 lb)   SpO2 99%   BMI 33.86 kg/m²   Smoking Status Never   BSA 2.13 m²       Assessment/Plan   The primary encounter diagnosis was Chronic systolic (congestive) heart failure (CMS/HCC). Diagnoses of CHB (complete heart block) (CMS/HCC) and Atypical chest pain were also pertinent to this visit.    1. Nonischemic cardiomyopathy:   Patient appears well compensated on exam.  TTE Oct 2023 with LVEF 40-45%  Patient stopped carvedilol, losartan, isosorbide and spironolactone and has been feeling much better.  Dizziness has resolved.  BP " stable.   Will repeat limited TTE in 2-3 months to re-evaluate LVEF since she is off all GDMT.     2. Complete heart block:   Patient has a history of congenital complete heart block.   She has a permanent pacemaker - right side of chest  Follows with  [facility]     3 Atypical chest pain/possible microvascular angina:   Continue Ranexa 500 mg BID  She stopped isosorbide on her own (in addition to carvedilol, losartan and spironolactone)  Stress test Oct 2023 with low probability of ischemia  Is she needs to go back on nitrate would need to consider isosorbide dinitrate as she is now s/p gastric sleeve    4. Factor V Leiden  Was on Xarelto prior and following gastric sleeve, but has since been discontinued  Follows with hematology    5. Dizziness, lightheadedness  Now resolved after stopping carvedilol, losartan, spironolactone and isosorbide    Rxoy Ortega, APRN-CNP

## 2023-10-24 ENCOUNTER — OFFICE VISIT (OUTPATIENT)
Dept: CARDIOLOGY | Facility: CLINIC | Age: 48
End: 2023-10-24
Payer: MEDICARE

## 2023-10-24 VITALS
HEART RATE: 74 BPM | SYSTOLIC BLOOD PRESSURE: 126 MMHG | OXYGEN SATURATION: 99 % | BODY MASS INDEX: 33.43 KG/M2 | WEIGHT: 213 LBS | DIASTOLIC BLOOD PRESSURE: 80 MMHG | HEIGHT: 67 IN

## 2023-10-24 DIAGNOSIS — I50.22 CHRONIC SYSTOLIC (CONGESTIVE) HEART FAILURE (MULTI): Primary | ICD-10-CM

## 2023-10-24 DIAGNOSIS — R42 LIGHTHEADEDNESS: ICD-10-CM

## 2023-10-24 DIAGNOSIS — I44.2 CHB (COMPLETE HEART BLOCK) (MULTI): ICD-10-CM

## 2023-10-24 DIAGNOSIS — R42 DIZZINESS: ICD-10-CM

## 2023-10-24 DIAGNOSIS — R07.89 ATYPICAL CHEST PAIN: ICD-10-CM

## 2023-10-24 PROCEDURE — 1036F TOBACCO NON-USER: CPT | Performed by: NURSE PRACTITIONER

## 2023-10-24 PROCEDURE — 3074F SYST BP LT 130 MM HG: CPT | Performed by: NURSE PRACTITIONER

## 2023-10-24 PROCEDURE — 3079F DIAST BP 80-89 MM HG: CPT | Performed by: NURSE PRACTITIONER

## 2023-10-24 PROCEDURE — 99213 OFFICE O/P EST LOW 20 MIN: CPT | Performed by: NURSE PRACTITIONER

## 2023-10-24 NOTE — PATIENT INSTRUCTIONS
Recommend Mediterranean style of eating  Stop carvedilol, losartan, isosorbide and spironolactone   Continue home blood pressure monitoring  Check limited echocardiogram in 2-3 months  Follow-up with Dr. Sanchez as scheduled  If you have any questions or cardiac concerns, please call our office at [phone].

## 2023-10-30 ENCOUNTER — APPOINTMENT (OUTPATIENT)
Dept: CARDIOLOGY | Facility: CLINIC | Age: 48
End: 2023-10-30
Payer: MEDICARE

## 2023-12-05 ENCOUNTER — HOSPITAL ENCOUNTER (OUTPATIENT)
Dept: CARDIOLOGY | Facility: HOSPITAL | Age: 48
Discharge: HOME | End: 2023-12-05
Payer: MEDICARE

## 2023-12-05 DIAGNOSIS — I44.2 ATRIOVENTRICULAR BLOCK, COMPLETE (MULTI): ICD-10-CM

## 2023-12-05 DIAGNOSIS — Z95.0 PRESENCE OF CARDIAC PACEMAKER: Primary | ICD-10-CM

## 2023-12-05 DIAGNOSIS — I50.42 CHRONIC COMBINED SYSTOLIC (CONGESTIVE) AND DIASTOLIC (CONGESTIVE) HEART FAILURE (MULTI): ICD-10-CM

## 2023-12-05 DIAGNOSIS — Z95.0 PRESENCE OF CARDIAC PACEMAKER: ICD-10-CM

## 2023-12-05 PROCEDURE — 93294 REM INTERROG EVL PM/LDLS PM: CPT | Performed by: INTERNAL MEDICINE

## 2023-12-05 PROCEDURE — 93296 REM INTERROG EVL PM/IDS: CPT

## 2023-12-13 LAB — HOLD SPECIMEN: NORMAL

## 2023-12-28 ENCOUNTER — HOSPITAL ENCOUNTER (OUTPATIENT)
Dept: CARDIOLOGY | Facility: HOSPITAL | Age: 48
Discharge: HOME | End: 2023-12-28
Payer: MEDICARE

## 2023-12-28 DIAGNOSIS — I50.22 CHRONIC SYSTOLIC (CONGESTIVE) HEART FAILURE (MULTI): ICD-10-CM

## 2023-12-28 LAB
EJECTION FRACTION APICAL 4 CHAMBER: 44.9
EJECTION FRACTION: 47
LEFT VENTRICLE INTERNAL DIMENSION DIASTOLE: 5.39 (ref 3.5–6)

## 2023-12-28 PROCEDURE — 93308 TTE F-UP OR LMTD: CPT

## 2023-12-28 PROCEDURE — 93308 TTE F-UP OR LMTD: CPT | Performed by: INTERNAL MEDICINE

## 2024-02-28 ENCOUNTER — HOSPITAL ENCOUNTER (OUTPATIENT)
Dept: CARDIOLOGY | Facility: HOSPITAL | Age: 49
Discharge: HOME | End: 2024-02-28
Payer: MEDICARE

## 2024-02-28 DIAGNOSIS — I44.2 ATRIOVENTRICULAR BLOCK, COMPLETE (MULTI): ICD-10-CM

## 2024-02-28 DIAGNOSIS — Z95.0 PRESENCE OF CARDIAC PACEMAKER: ICD-10-CM

## 2024-02-28 PROCEDURE — 93280 PM DEVICE PROGR EVAL DUAL: CPT | Performed by: INTERNAL MEDICINE

## 2024-02-28 PROCEDURE — 93280 PM DEVICE PROGR EVAL DUAL: CPT

## 2024-03-08 ENCOUNTER — OFFICE VISIT (OUTPATIENT)
Dept: CARDIOLOGY | Facility: CLINIC | Age: 49
End: 2024-03-08
Payer: MEDICARE

## 2024-03-08 VITALS
DIASTOLIC BLOOD PRESSURE: 70 MMHG | BODY MASS INDEX: 32.14 KG/M2 | HEIGHT: 66 IN | WEIGHT: 200 LBS | SYSTOLIC BLOOD PRESSURE: 116 MMHG | HEART RATE: 81 BPM

## 2024-03-08 DIAGNOSIS — I50.42 CHRONIC COMBINED SYSTOLIC AND DIASTOLIC CHF (CONGESTIVE HEART FAILURE) (MULTI): ICD-10-CM

## 2024-03-08 DIAGNOSIS — I44.2 CHB (COMPLETE HEART BLOCK) (MULTI): Primary | ICD-10-CM

## 2024-03-08 PROBLEM — M75.40 IMPINGEMENT SYNDROME OF SHOULDER REGION: Status: ACTIVE | Noted: 2020-01-03

## 2024-03-08 PROBLEM — I82.611 ACUTE THROMBOSIS OF SUPERFICIAL VEINS OF RIGHT UPPER EXTREMITY: Status: ACTIVE | Noted: 2023-09-23

## 2024-03-08 PROBLEM — I20.0 UNSTABLE ANGINA PECTORIS (MULTI): Status: ACTIVE | Noted: 2024-03-08

## 2024-03-08 PROBLEM — Z86.32 HISTORY OF GESTATIONAL DIABETES MELLITUS (GDM): Status: ACTIVE | Noted: 2024-03-08

## 2024-03-08 PROCEDURE — 93000 ELECTROCARDIOGRAM COMPLETE: CPT | Performed by: INTERNAL MEDICINE

## 2024-03-08 PROCEDURE — 1036F TOBACCO NON-USER: CPT | Performed by: INTERNAL MEDICINE

## 2024-03-08 PROCEDURE — 99215 OFFICE O/P EST HI 40 MIN: CPT | Performed by: INTERNAL MEDICINE

## 2024-03-08 PROCEDURE — 3074F SYST BP LT 130 MM HG: CPT | Performed by: INTERNAL MEDICINE

## 2024-03-08 PROCEDURE — 3078F DIAST BP <80 MM HG: CPT | Performed by: INTERNAL MEDICINE

## 2024-03-08 RX ORDER — VENLAFAXINE 75 MG/1
75 TABLET ORAL DAILY
COMMUNITY
End: 2024-03-08 | Stop reason: WASHOUT

## 2024-03-08 RX ORDER — FLUTICASONE FUROATE AND VILANTEROL 100; 25 UG/1; UG/1
1 POWDER RESPIRATORY (INHALATION)
COMMUNITY
Start: 2023-10-05 | End: 2024-03-08 | Stop reason: WASHOUT

## 2024-03-08 RX ORDER — PANTOPRAZOLE SODIUM 40 MG/1
40 TABLET, DELAYED RELEASE ORAL
COMMUNITY
Start: 2023-10-06 | End: 2024-03-08 | Stop reason: WASHOUT

## 2024-03-08 NOTE — PATIENT INSTRUCTIONS
Thanks for following up in office today.    1)  My suggestion is to monitor you closely for heart failure symptoms and start you on meds as needed.    2)  I reviewed the risks of stratteraI want you to consider the risks      3)  Please continue your cardiac medications as prescribed.    Follow up with RAMY Ortega NP in 6 months  If you have any questions, please call [phone] and choose option for Dr. Sanchez's nurse Bianka Su
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
## 2024-05-30 ENCOUNTER — HOSPITAL ENCOUNTER (OUTPATIENT)
Dept: CARDIOLOGY | Facility: HOSPITAL | Age: 49
Discharge: HOME | End: 2024-05-30
Payer: MEDICARE

## 2024-05-30 DIAGNOSIS — I50.42 CHRONIC COMBINED SYSTOLIC AND DIASTOLIC CHF (CONGESTIVE HEART FAILURE) (MULTI): ICD-10-CM

## 2024-05-30 DIAGNOSIS — I44.2 ATRIOVENTRICULAR BLOCK, COMPLETE (MULTI): ICD-10-CM

## 2024-05-30 DIAGNOSIS — Z95.0 PRESENCE OF CARDIAC PACEMAKER: ICD-10-CM

## 2024-05-30 DIAGNOSIS — I50.42 CHRONIC COMBINED SYSTOLIC (CONGESTIVE) AND DIASTOLIC (CONGESTIVE) HEART FAILURE (MULTI): ICD-10-CM

## 2024-05-30 DIAGNOSIS — I44.2 ATRIOVENTRICULAR BLOCK, COMPLETE (MULTI): Primary | ICD-10-CM

## 2024-05-30 PROCEDURE — 93296 REM INTERROG EVL PM/IDS: CPT

## 2024-06-17 DIAGNOSIS — R07.9 CHEST PAIN, UNSPECIFIED: ICD-10-CM

## 2024-06-19 RX ORDER — RANOLAZINE 500 MG/1
500 TABLET, EXTENDED RELEASE ORAL EVERY 12 HOURS
Qty: 180 TABLET | Refills: 3 | Status: SHIPPED | OUTPATIENT
Start: 2024-06-19

## 2024-09-05 RX ORDER — DULOXETIN HYDROCHLORIDE 30 MG/1
CAPSULE, DELAYED RELEASE ORAL
COMMUNITY
Start: 2024-03-26

## 2024-09-05 RX ORDER — ISOSORBIDE MONONITRATE 30 MG/1
1 TABLET, EXTENDED RELEASE ORAL DAILY
COMMUNITY
End: 2024-09-11 | Stop reason: WASHOUT

## 2024-09-05 RX ORDER — CARVEDILOL 25 MG/1
1 TABLET ORAL DAILY
COMMUNITY
End: 2024-09-11 | Stop reason: WASHOUT

## 2024-09-05 RX ORDER — OXYBUTYNIN CHLORIDE 5 MG/1
TABLET ORAL
COMMUNITY
End: 2024-09-11 | Stop reason: WASHOUT

## 2024-09-05 RX ORDER — SPIRONOLACTONE 25 MG/1
1 TABLET ORAL DAILY
COMMUNITY
End: 2024-09-11 | Stop reason: WASHOUT

## 2024-09-05 RX ORDER — LOSARTAN POTASSIUM 100 MG/1
1 TABLET ORAL DAILY
COMMUNITY
End: 2024-09-11 | Stop reason: WASHOUT

## 2024-09-10 PROBLEM — I20.89 MICROVASCULAR ANGINA (CMS-HCC): Status: ACTIVE | Noted: 2024-09-10

## 2024-09-10 ASSESSMENT — ENCOUNTER SYMPTOMS
FEVER: 0
SHORTNESS OF BREATH: 0
PALPITATIONS: 0
NEAR-SYNCOPE: 0
VOMITING: 0
COUGH: 0
NAUSEA: 0
CHILLS: 0
IRREGULAR HEARTBEAT: 0
HEMATURIA: 0
DYSPNEA ON EXERTION: 0
ALTERED MENTAL STATUS: 0
WHEEZING: 0
HEMATOCHEZIA: 0
SYNCOPE: 0
ORTHOPNEA: 0

## 2024-09-10 NOTE — PROGRESS NOTES
Chief Complaint/Reason for Visit:  No chief complaint on file. 6 month follow up    History Of Present Illness:    Barbi Karimi is a 48 y.o. female that presents to the office for 6 month follow up.  Taking medications as prescribed.     During last office visit her spironolactone dose was reduced d/t lightheadedness and weight loss S/p gastric sleeve March 2023.  She was admitted to [facility] 10/4/23-10/6/23 for lightheadedness, chest heaviness and tachycardia.  she was working at a [employer] when she felt dizziness acutely - with some associated heaviness in her chest.  HR on her fitness watch was in the 130's.  She initially thought this may be 2/2 dehydration or hypoglycemia - drank some water and ate something, and symptoms seemed to resolve after 30 mins.  The folowing night again at the [employer] she had lightheadness -  was at the game and told her she looked pale.  HR at the time 70's.  She was walking with her  when she felt severe sudden onset chest heaviness prompting him to call 911.    HSTI 4 > 3 > 5 > 4.    Stress test with low probability of ischemia and echo with LVEF 40-45% which is a chronic finding.      After hospitalization she stopped carvedilol, spironolactone, isosorbide and losartan on her own as she noticed after taking them that she was dizzy.      Today she reports overall feeling well.  She will be looking into potentially having plastic surgery to remove excess skin after weight loss, right hip surgery and left shoulder labrum surgery.  She is aware to call our office if any surgical risk stratification will needed prior to procedure so that we can get this completed prior.      Past Medical History:  She has a past medical history of Personal history of gestational diabetes and Personal history of other diseases of the musculoskeletal system and connective tissue.    Past Surgical History:  She has a past surgical history that  includes Cardiac pacemaker placement (08/11/2016); Other surgical history (08/11/2016); Cholecystectomy (08/11/2016); Hysterectomy (08/11/2016); and Insert / replace / remove pacemaker (08/11/2016).      Social History:  She reports that she has never smoked. She has never used smokeless tobacco. No history on file for alcohol use and drug use.    Family History:  Family History   Problem Relation Name Age of Onset    Crohn's disease Mother      Hypertension Mother      Lung cancer Mother      Thyroid disease Mother      Diabetes Father      Hypertension Father      Thyroid disease Father          Allergies:  Aspirin, House dust mite, Mold, and Epinephrine    Review of Systems   Constitutional: Negative for chills and fever.   HENT:  Positive for congestion (nasal - currently taking antibiotics).    Cardiovascular:  Negative for chest pain, dyspnea on exertion, irregular heartbeat, leg swelling, near-syncope, orthopnea, palpitations and syncope.   Respiratory:  Negative for cough, shortness of breath and wheezing.    Musculoskeletal:  Positive for arthritis (fibromyalgia and arthritis) and joint pain (right hip; left shoulder).   Gastrointestinal:  Negative for hematochezia, melena, nausea and vomiting.   Genitourinary:  Negative for hematuria.   Psychiatric/Behavioral:  Negative for altered mental status.      Objective      Vitals reviewed.   Constitutional:       Appearance: Not in distress.   Pulmonary:      Effort: Pulmonary effort is normal.      Breath sounds: Normal breath sounds.   Cardiovascular:      PMI at left midclavicular line. Normal rate. Regular rhythm. S1 with normal intensity. S2 with normal intensity.       Murmurs: There is no murmur.   Edema:     Peripheral edema absent.   Abdominal:      General: Bowel sounds are normal.   Neurological:      Mental Status: Alert and oriented to person, place and time.         Current Outpatient Medications   Medication Instructions    albuterol (ProAir HFA)  90 mcg/actuation inhaler 2 puffs, inhalation, Every 6 hours PRN    atomoxetine (STRATTERA) 100 mg, oral, Daily    BIOTIN ORAL 1 capsule, oral, Daily    budesonide-formoteroL (Symbicort) 80-4.5 mcg/actuation inhaler 2 puffs, inhalation, 2 times daily, RINSE MOUTH AFTER USE     calcium citrate 500 mg, oral, 3 times daily    carvedilol (Coreg) 25 mg tablet 1 tablet, oral, Daily    cholecalciferol (VITAMIN D-3) 50 mcg, oral, Daily    cyanocobalamin, vitamin B-12, (VITAMIN B-12 ORAL) 1 tablet, oral, Daily    DULoxetine (Cymbalta) 30 mg DR capsule     DULoxetine (CYMBALTA) 90 mg, oral, Daily    EnbreL Mini 50 mg, subcutaneous, Every 7 days    gabapentin (Neurontin) 800 mg tablet 1 tablet, oral, 2 times daily    isosorbide mononitrate ER (Imdur) 30 mg 24 hr tablet 1 tablet, oral, Daily    levothyroxine (Synthroid, Levoxyl) 25 mcg tablet 1 tablet, oral, Daily    losartan (Cozaar) 100 mg tablet 1 tablet, oral, Daily    MULTIVITAMIN ORAL 1 tablet, oral, Daily    nystatin (Mycostatin) 100,000 unit/gram powder Apply topically to affected area two times daily.    oxybutynin (Ditropan) 5 mg tablet oral    ranolazine (RANEXA) 500 mg, oral, Every 12 hours    rivaroxaban (Xarelto) 10 mg tablet 1 tablet, oral, Daily    spironolactone (Aldactone) 25 mg tablet 1 tablet, oral, Daily    sulfamethoxazole-trimethoprim (Bactrim DS) 800-160 mg tablet 1 tablet, oral, 2 times daily        Last Labs:  CBC -  Lab Results   Component Value Date    WBC 5.8 10/06/2023    HGB 11.5 (L) 10/06/2023    HCT 35.3 (L) 10/06/2023    MCV 95 10/06/2023     10/06/2023       CMP -  Lab Results   Component Value Date    CALCIUM 9.3 10/06/2023    PHOS 3.7 05/29/2021    PROT 6.2 (L) 10/04/2023    ALBUMIN 3.8 10/04/2023    AST 19 10/04/2023    ALT 18 10/04/2023    ALKPHOS 74 10/04/2023    BILITOT 0.5 10/04/2023       LIPID PANEL -   Lab Results   Component Value Date    CHOL 181 11/25/2019    TRIG 94 11/25/2019    HDL 44.1 11/25/2019    CHHDL 4.1 11/25/2019     LDLF 118 (H) 11/25/2019    VLDL 19 11/25/2019       RENAL FUNCTION PANEL -   Lab Results   Component Value Date    GLUCOSE 91 10/06/2023     10/06/2023    K 3.8 10/06/2023     (H) 10/06/2023    CO2 28 10/06/2023    ANIONGAP 9 (L) 10/06/2023    BUN 22 10/06/2023    CREATININE 0.87 10/06/2023    CALCIUM 9.3 10/06/2023    PHOS 3.7 05/29/2021    ALBUMIN 3.8 10/04/2023        Lab Results   Component Value Date    HGBA1C 5.1 05/15/2023       No results found for this or any previous visit.     Last Cardiology Tests:    Last PPM interrogation 5/30/24    Limited TTE 12/28/23  1. Left ventricular systolic function is mildly decreased with a 45% estimated ejection fraction.   2. Multiple segmental abnormalities exist. See findings.    Stress test 10/11/23  1. Indeterminate due to presence of nonspecific IVCD on baseline EKG.   2. Indeterminate Stress Test.   3. No clinical evidence for ischemia at maximal infusion.   4. Correlate with myocardial perfusion imaging results.   5. Nuclear image results are reported separately.  1. Fixed perfusion defects as noted above. No reversible perfusion   defects seen. There is a low probability of ischemia.   2. Calculated ejection fraction of 43% with apical akinesis.     Echo 10/6/23:   1. Left ventricular systolic function is mildly decreased with a 40-45% estimated ejection fraction.   2. Multiple segmental abnormalities exist. See findings.   3. Spectral Doppler shows an impaired relaxation pattern of left ventricular diastolic filling.   4. There is mildly reduced right ventricular systolic function.   5. RVSP within normal limits.   6. There are multiple wall motion abnormalities.    [facility] 2/10/22:    1. No significant CAD. Consider microvascular disease.   2. No significant LV-AO peak to peak pullback gradient.     Stress test 10/11/23:   Summary:   1. Indeterminate due to presence of nonspecific IVCD on baseline EKG.   2. Indeterminate Stress Test.   3. No clinical  "evidence for ischemia at maximal infusion.   4. Correlate with myocardial perfusion imaging results.   5. Nuclear image results are reported separately.    There is a small, moderate intensity, fixed perfusion defect in the  apical/inferoapical wall with associated wall motion abnormality  consistent with prior infarct. There is a small to moderate size,  mild intensity, fixed perfusion defect in the inferior wall. No  reversible perfusion defects seen.      Calculated ejection fraction of 43% with apical akinesis.    1. Fixed perfusion defects as noted above. No reversible perfusion  defects seen. There is a low probability of ischemia.  2. Calculated ejection fraction of 43% with apical akinesis.    Visit Vitals  /74   Pulse 94   Ht 1.676 m (5' 6\")   Wt 91 kg (200 lb 9.6 oz)   LMP  (LMP Unknown)   BMI 32.38 kg/m²   OB Status Hysterectomy   Smoking Status Never   BSA 2.06 m²       Assessment/Plan   The primary encounter diagnosis was Chronic systolic (congestive) heart failure (Multi). Diagnoses of Presence of cardiac pacemaker, Microvascular angina (CMS-HCC), CHB (complete heart block) (Multi), and Chronic combined systolic and diastolic CHF (congestive heart failure) (Multi) were also pertinent to this visit.    1. Nonischemic cardiomyopathy; HFrEF   Patient appears well compensated on exam.  TTE Oct 2023 with LVEF 40-45%  Repeat limited TTE Dec 2023 with LVEF 45%  Patient stopped carvedilol, losartan, isosorbide and spironolactone and has been feeling much better.  Dizziness has resolved.  BP stable.   Dr. Cierra Sanchez discussed last office visit that medicines for ADHD including atomoxetine and adderall - the CV risks of these meds, including sudden cardiac death, tachycardia/HTN, CVA, MI, etc.    With her cardiomyopathy, we discussed that she may be at higher risk for SCD than someone without cardiomyopathy on these medications.  If GDMT is ever added, would start with beta blocker such as carvedilol or " metoprolol succinate.     2. Complete heart block:   Patient has a history of congenital complete heart block.   She has a [employer] pacemaker - right side of chest  Follows with  [facility]     3 Atypical chest pain/possible microvascular angina:   Continue Ranexa 500 mg BID  She stopped isosorbide previously (in addition to carvedilol, losartan and spironolactone)  Stress test Oct 2023 with low probability of ischemia  If she needs to go back on nitrate would need to consider isosorbide dinitrate as she is now s/p gastric sleeve    4. Factor V Leiden  Was on Xarelto prior and following gastric sleeve, but has since been discontinued  Follows with hematology    5. Dizziness, lightheadedness  Resolved after stopping carvedilol, losartan, spironolactone and isosorbide    Roxy Ortega, APRN-CNP

## 2024-09-11 ENCOUNTER — APPOINTMENT (OUTPATIENT)
Dept: CARDIOLOGY | Facility: CLINIC | Age: 49
End: 2024-09-11
Payer: MEDICARE

## 2024-09-11 VITALS
HEIGHT: 66 IN | BODY MASS INDEX: 32.24 KG/M2 | WEIGHT: 200.6 LBS | HEART RATE: 94 BPM | SYSTOLIC BLOOD PRESSURE: 116 MMHG | DIASTOLIC BLOOD PRESSURE: 74 MMHG

## 2024-09-11 DIAGNOSIS — I50.42 CHRONIC COMBINED SYSTOLIC AND DIASTOLIC CHF (CONGESTIVE HEART FAILURE) (MULTI): ICD-10-CM

## 2024-09-11 DIAGNOSIS — I50.22 CHRONIC SYSTOLIC (CONGESTIVE) HEART FAILURE (MULTI): Primary | ICD-10-CM

## 2024-09-11 DIAGNOSIS — I20.89 MICROVASCULAR ANGINA (CMS-HCC): ICD-10-CM

## 2024-09-11 DIAGNOSIS — Z95.0 PRESENCE OF CARDIAC PACEMAKER: ICD-10-CM

## 2024-09-11 DIAGNOSIS — I44.2 CHB (COMPLETE HEART BLOCK) (MULTI): ICD-10-CM

## 2024-09-11 PROCEDURE — 99213 OFFICE O/P EST LOW 20 MIN: CPT | Performed by: NURSE PRACTITIONER

## 2024-09-11 PROCEDURE — 3074F SYST BP LT 130 MM HG: CPT | Performed by: NURSE PRACTITIONER

## 2024-09-11 PROCEDURE — 3008F BODY MASS INDEX DOCD: CPT | Performed by: NURSE PRACTITIONER

## 2024-09-11 PROCEDURE — 3078F DIAST BP <80 MM HG: CPT | Performed by: NURSE PRACTITIONER

## 2024-09-11 PROCEDURE — 1036F TOBACCO NON-USER: CPT | Performed by: NURSE PRACTITIONER

## 2024-09-11 RX ORDER — SULFAMETHOXAZOLE AND TRIMETHOPRIM 800; 160 MG/1; MG/1
1 TABLET ORAL 2 TIMES DAILY
COMMUNITY
Start: 2024-09-10 | End: 2024-09-17

## 2024-10-31 ENCOUNTER — HOSPITAL ENCOUNTER (OUTPATIENT)
Dept: CARDIOLOGY | Facility: HOSPITAL | Age: 49
Discharge: HOME | End: 2024-10-31
Payer: MEDICARE

## 2024-10-31 DIAGNOSIS — I44.2 ATRIOVENTRICULAR BLOCK, COMPLETE (MULTI): Primary | ICD-10-CM

## 2024-10-31 DIAGNOSIS — I50.42 CHRONIC COMBINED SYSTOLIC AND DIASTOLIC CHF (CONGESTIVE HEART FAILURE): ICD-10-CM

## 2024-10-31 DIAGNOSIS — I44.2 ATRIOVENTRICULAR BLOCK, COMPLETE (MULTI): ICD-10-CM

## 2024-10-31 PROCEDURE — 93296 REM INTERROG EVL PM/IDS: CPT

## 2024-12-13 ENCOUNTER — TELEPHONE (OUTPATIENT)
Dept: CARDIOLOGY | Facility: HOSPITAL | Age: 49
End: 2024-12-13
Payer: COMMERCIAL

## 2024-12-13 NOTE — TELEPHONE ENCOUNTER
Received MRI clearance form from [facility]. Patient is scheduled for MRI 1/20. Emailed form and contact info to [employer] for completion.     [employer] emailed completed form back - faxed to [facility] SHAUNA Wu RN.

## 2025-01-29 ENCOUNTER — HOSPITAL ENCOUNTER (OUTPATIENT)
Dept: CARDIOLOGY | Facility: HOSPITAL | Age: 50
Discharge: HOME | End: 2025-01-29
Payer: COMMERCIAL

## 2025-01-29 DIAGNOSIS — I44.2 ATRIOVENTRICULAR BLOCK, COMPLETE (MULTI): ICD-10-CM

## 2025-01-29 DIAGNOSIS — I44.2 ATRIOVENTRICULAR BLOCK, COMPLETE (MULTI): Primary | ICD-10-CM

## 2025-01-29 PROCEDURE — 93280 PM DEVICE PROGR EVAL DUAL: CPT

## 2025-02-03 NOTE — PROGRESS NOTES
Chief Complaint:   No chief complaint on file.     History Of Present Illness:    Barbi Karimi is a 49 y.o. female presenting pre-op risk stratification.  H/o nonischemic cardiomyopathy/HFrEF, complete heart block, atypical chest pain/possible microvascular angina, Factor V Leiden, HLD, prediabetes.  Last seen in 9/2024 by RAMY Ortega. She was doing well at the time and mentioned multiple possible surgeries.     Today, Pt is scheduled for BL breast lift and skin removal from abdomen on 2/28/25 with Dr. Rachel in [facility]. In the last 2 years, she has lost about 100 lbs.    The patient is doing well from cardiovascular perspective.  Denies chest pain/pressure, SOB, dyspnea on exertion, LE edema, orthopnea, PND, palpitations, LH/dizziness, presyncope, overt syncope.  Compliant with home cardiac medications as prescribed. She believes she could walk up 2 flights of stairs without issues.    Review Of Systems:  The remainder of the review of systems was obtained, and was negative as pertains to the chief complaint.    CV testing and labs reviewed:  I personally reviewed the EKG in the office today which demonstrates  A sensed    Labs 6/2024:  K 4.3  BUN 19  Cr 0.77  AST 15  ALT 16  H&H  13.2 40.1  TSH 1.0   Lipid panel 6/2024:   HDL 62        Cardiac device check 1/2025: R pectoral implant side healed without S/Sx of infection. 0 ATR episodes & 0 VHR episodes since last evaluation on 10/31/24. Note: Pt had MRI on 1/20 per device. Presenting AS-, underlying CHB. Reprogrammed for threshold testing. Battery, est. 5 years remaining. Remote FU in 3 months.    TTE limited 12/2023:  CONCLUSIONS:   1. Left ventricular systolic function is mildly decreased with a 45% estimated ejection fraction.   2. Multiple segmental abnormalities exist. See findings.    Nuclear stress test 10/2023:  IMPRESSION:  1. Fixed perfusion defects as noted above. No reversible perfusion defects seen. There is a  low probability of ischemia.  2. Calculated ejection fraction of 43% with apical akinesis.    [facility] 2/2022:  CONCLUSIONS:   1. No significant CAD. Consider microvascular disease.   2. No significant LV-AO peak to peak pullback gradient.    [facility] 5/2021:  CONCLUSIONS:   1. The left ventricular systolic function is moderately to severely decreased.   2. The left atrium is moderately dilated.   3. LATOYA for monitoring of pericardia effusion for lead revision. No new Effusion.   4. All findings discussed with Dr. Fernandez.    Last Recorded Vitals:  There were no vitals filed for this visit.    Past Medical History:  She has a past medical history of Personal history of gestational diabetes and Personal history of other diseases of the musculoskeletal system and connective tissue.    Past Surgical History:  She has a past surgical history that includes Cardiac pacemaker placement (08/11/2016); Other surgical history (08/11/2016); Cholecystectomy (08/11/2016); Hysterectomy (08/11/2016); and Insert / replace / remove pacemaker (08/11/2016).      Social History:  She reports that she has never smoked. She has never used smokeless tobacco. No history on file for alcohol use and drug use.    Family History:  Family History   Problem Relation Name Age of Onset    Crohn's disease Mother      Hypertension Mother      Lung cancer Mother      Thyroid disease Mother      Diabetes Father      Hypertension Father      Thyroid disease Father          Allergies:  Aspirin, House dust mite, Mold, and Epinephrine    Outpatient Medications:  Current Outpatient Medications   Medication Instructions    albuterol (ProAir HFA) 90 mcg/actuation inhaler 2 puffs, Every 6 hours PRN    BIOTIN ORAL 1 capsule, Daily    budesonide-formoteroL (Symbicort) 80-4.5 mcg/actuation inhaler 2 puffs, 2 times daily    calcium citrate 500 mg, 3 times daily    cholecalciferol (VITAMIN D-3) 50 mcg, Daily    cyanocobalamin, vitamin B-12, (VITAMIN B-12 ORAL) 1 tablet, Daily     DULoxetine (Cymbalta) 30 mg DR capsule     DULoxetine (CYMBALTA) 90 mg, Daily    EnbreL Mini 50 mg, Every 7 days    gabapentin (Neurontin) 800 mg tablet 1 tablet, 2 times daily    levothyroxine (Synthroid, Levoxyl) 25 mcg tablet 1 tablet, Daily    MULTIVITAMIN ORAL 1 tablet, Daily    nystatin (Mycostatin) 100,000 unit/gram powder Apply topically to affected area two times daily.    ranolazine (RANEXA) 500 mg, oral, Every 12 hours    vortioxetine (TRINTELLIX) 10 mg, Daily       Physical Exam:  Physical Exam  HENT:      Head: Normocephalic.      Nose: Nose normal.      Mouth/Throat:      Mouth: Mucous membranes are moist.   Cardiovascular:      Rate and Rhythm: Normal rate and regular rhythm.      Heart sounds: No murmur heard.     Comments: Normal S1, S2 regular  No carotid bruits  No JVD at 90 deg  Pulmonary:      Effort: Pulmonary effort is normal.      Breath sounds: Normal breath sounds.   Abdominal:      Palpations: Abdomen is soft.   Musculoskeletal:         General: Normal range of motion.      Cervical back: Normal range of motion.      Right lower leg: No edema.      Left lower leg: No edema.   Skin:     General: Skin is warm and dry.   Neurological:      Mental Status: She is alert.   Psychiatric:         Mood and Affect: Mood normal.        Last Labs:  CBC -  Lab Results   Component Value Date    WBC 5.8 10/06/2023    HGB 11.5 (L) 10/06/2023    HCT 35.3 (L) 10/06/2023    MCV 95 10/06/2023     10/06/2023       CMP -  Lab Results   Component Value Date    CALCIUM 9.3 10/06/2023    PHOS 3.7 05/29/2021    PROT 6.2 (L) 10/04/2023    ALBUMIN 3.8 10/04/2023    AST 19 10/04/2023    ALT 18 10/04/2023    ALKPHOS 74 10/04/2023    BILITOT 0.5 10/04/2023       LIPID PANEL -   Lab Results   Component Value Date    CHOL 181 11/25/2019    TRIG 94 11/25/2019    HDL 44.1 11/25/2019    CHHDL 4.1 11/25/2019    LDLF 118 (H) 11/25/2019    VLDL 19 11/25/2019       RENAL FUNCTION PANEL -   Lab Results   Component Value  Date    GLUCOSE 91 10/06/2023     10/06/2023    K 3.8 10/06/2023     (H) 10/06/2023    CO2 28 10/06/2023    ANIONGAP 9 (L) 10/06/2023    BUN 22 10/06/2023    CREATININE 0.87 10/06/2023    CALCIUM 9.3 10/06/2023    PHOS 3.7 05/29/2021    ALBUMIN 3.8 10/04/2023        Lab Results   Component Value Date    HGBA1C 5.1 05/15/2023     Assessment/Plan   Pre-op risk stratification: prior to BL breast reduction with Dr. Rachel at [facility]  on 2/28/25.  She denies angina and HF symptoms and appears euvolemic on exam today.  EKG demontrates a-sensed v-paced rhythm.  Based on RCRI risk calculator, she is class I risk corresponding to low risk of cardiovascular events with the upcoming surgery.  -Pt is at a reasonable risk to undergo surgery  -this note will be faxed to Dr. Covington's office for their review    Nonischemic cardiomyopathy:   Patient appears well compensated on exam.  TTE Oct 2023 with LVEF 40-45%  Repeat limited TTE Dec 2023 with LVEF 45%  Patient stopped carvedilol, losartan, isosorbide and spironolactone and has been feeling much better. Dizziness has resolved. BP stable.   We also discussed medicines for ADHD including atomoxetine and adderall -  the CV risks of these meds, including sudden cardiac death, tachycardia/HTN, CVA, MI, etc.    With her cardiomyopathy, we discussed that she may be at higher risk for SCD than someone without cardiomyopathy on these medications.  If GDMT is ever added, would start with beta blocker such as carvedilol or metoprolol succinate.   -instructed Pt to FU if she becomes symptomatic to repeat TTE     Complete heart block:   Patient has a history of congenital complete heart block.   She has a permanent pacemaker - right side of chest  Last device check in 1/2025 demonstrated no AHR/VHF.   -follows with  [facility]     Atypical chest pain/possible microvascular angina:   Stress test Oct 2023 with low probability of ischemia  She stopped  isosorbide previously (in addition to carvedilol, losartan and spironolactone). If she needs to go back on nitrate would need to consider isosorbide dinitrate as she is now s/p gastric sleeve.  -continue Ranexa 500 mg BID     Factor V Leiden:  Was on Xarelto prior and following gastric sleeve, but has since been discontinued at recommendation of her new hematologist.  -continue FU with hematology     Dizziness, lightheadedness:  Resolved after stopping carvedilol, losartan, spironolactone and isosorbide.    HLD:  FLP 6/2024,  , not at goal but likely improved since significant weight loss.  -anticipate repeat FLP with PCP    Scribe Attestation  By signing my name below, I, Alfredo Wolf   attest that this documentation has been prepared under the direction and in the presence of Cierra Sanchez MD.

## 2025-02-05 ENCOUNTER — APPOINTMENT (OUTPATIENT)
Dept: CARDIOLOGY | Facility: HOSPITAL | Age: 50
End: 2025-02-05
Payer: COMMERCIAL

## 2025-02-06 ENCOUNTER — OFFICE VISIT (OUTPATIENT)
Dept: CARDIOLOGY | Facility: HOSPITAL | Age: 50
End: 2025-02-06
Payer: COMMERCIAL

## 2025-02-06 VITALS
HEART RATE: 74 BPM | DIASTOLIC BLOOD PRESSURE: 84 MMHG | BODY MASS INDEX: 33.88 KG/M2 | HEIGHT: 66 IN | WEIGHT: 210.8 LBS | SYSTOLIC BLOOD PRESSURE: 132 MMHG

## 2025-02-06 DIAGNOSIS — I44.2 CHB (COMPLETE HEART BLOCK): ICD-10-CM

## 2025-02-06 DIAGNOSIS — Z01.810 PREOPERATIVE CARDIOVASCULAR EXAMINATION: Primary | ICD-10-CM

## 2025-02-06 DIAGNOSIS — Z95.0 PRESENCE OF CARDIAC PACEMAKER: ICD-10-CM

## 2025-02-06 DIAGNOSIS — I50.22 CHRONIC SYSTOLIC (CONGESTIVE) HEART FAILURE: ICD-10-CM

## 2025-02-06 LAB
ATRIAL RATE: 74 BPM
P AXIS: 43 DEGREES
P OFFSET: 154 MS
P ONSET: 97 MS
PR INTERVAL: 178 MS
Q ONSET: 186 MS
QRS COUNT: 12 BEATS
QRS DURATION: 168 MS
QT INTERVAL: 450 MS
QTC CALCULATION(BAZETT): 499 MS
QTC FREDERICIA: 483 MS
R AXIS: -56 DEGREES
T AXIS: 104 DEGREES
T OFFSET: 411 MS
VENTRICULAR RATE: 74 BPM

## 2025-02-06 PROCEDURE — 99215 OFFICE O/P EST HI 40 MIN: CPT | Mod: 25 | Performed by: INTERNAL MEDICINE

## 2025-02-06 PROCEDURE — 3079F DIAST BP 80-89 MM HG: CPT | Performed by: INTERNAL MEDICINE

## 2025-02-06 PROCEDURE — 3008F BODY MASS INDEX DOCD: CPT | Performed by: INTERNAL MEDICINE

## 2025-02-06 PROCEDURE — 93005 ELECTROCARDIOGRAM TRACING: CPT | Performed by: INTERNAL MEDICINE

## 2025-02-06 PROCEDURE — 3075F SYST BP GE 130 - 139MM HG: CPT | Performed by: INTERNAL MEDICINE

## 2025-02-06 PROCEDURE — 1036F TOBACCO NON-USER: CPT | Performed by: INTERNAL MEDICINE

## 2025-02-06 PROCEDURE — 99215 OFFICE O/P EST HI 40 MIN: CPT | Performed by: INTERNAL MEDICINE

## 2025-02-06 NOTE — PATIENT INSTRUCTIONS
Thanks for following up in office today.    1)  You are at an acceptable risk to undergo your surgery on 2/28/25.    2)  You can anticipate to get your blood work rechecked with your PCP in June.    3)  Please continue your cardiac medications as prescribed.    Follow up with Roxy Ortega in 6 months  If you have any questions, please call us at [phone]

## 2025-03-27 DIAGNOSIS — R07.9 CHEST PAIN, UNSPECIFIED: ICD-10-CM

## 2025-04-02 RX ORDER — RANOLAZINE 500 MG/1
500 TABLET, EXTENDED RELEASE ORAL EVERY 12 HOURS
Qty: 180 TABLET | Refills: 3 | Status: SHIPPED | OUTPATIENT
Start: 2025-04-02

## 2025-05-01 ENCOUNTER — HOSPITAL ENCOUNTER (OUTPATIENT)
Dept: CARDIOLOGY | Facility: HOSPITAL | Age: 50
Discharge: HOME | End: 2025-05-01
Payer: COMMERCIAL

## 2025-05-01 DIAGNOSIS — I44.2 ATRIOVENTRICULAR BLOCK, COMPLETE (MULTI): ICD-10-CM

## 2025-05-01 PROCEDURE — 93296 REM INTERROG EVL PM/IDS: CPT

## 2025-06-03 ENCOUNTER — APPOINTMENT (OUTPATIENT)
Dept: CARDIOLOGY | Facility: HOSPITAL | Age: 50
End: 2025-06-03
Payer: COMMERCIAL

## 2025-08-01 ENCOUNTER — TELEPHONE (OUTPATIENT)
Dept: CARDIOLOGY | Facility: HOSPITAL | Age: 50
End: 2025-08-01
Payer: COMMERCIAL

## 2025-08-01 NOTE — TELEPHONE ENCOUNTER
Called and confirmed patients appointment and requested an updated list of medications be brought with them.   Lala Monahan MA

## 2025-08-03 ASSESSMENT — ENCOUNTER SYMPTOMS
HEMATURIA: 0
COUGH: 0
ALTERED MENTAL STATUS: 0
SYNCOPE: 0
FEVER: 0
ORTHOPNEA: 0
SHORTNESS OF BREATH: 0
NAUSEA: 0
VOMITING: 0
NEAR-SYNCOPE: 0
DYSPNEA ON EXERTION: 0
IRREGULAR HEARTBEAT: 0
CHILLS: 0
HEMATOCHEZIA: 0
WHEEZING: 0

## 2025-08-03 NOTE — PATIENT INSTRUCTIONS
Recommend Mediterranean style of eating  Continue current medications  Follow-up with Dr. Cierra Sanchez in 6 months  If you have any questions or cardiac concerns, please call our office at [phone].,

## 2025-08-03 NOTE — PROGRESS NOTES
Chief Complaint/Reason for Visit:  Follow-up 6 month follow up    History Of Present Illness:    Barbi Karimi is a 49 y.o. female that presents to the office for 6 month follow up.  Taking medications as prescribed.     PMH significant for HFrEF, CHB s/p PPM, Factor V Leiden, dyslipidemia, gastric sleeve March 2023 and prediabetes.     Today she reports overall feeling well.  She had panniculectomy and the PE after surgery.  She is now on apixaban.     She reports issues with vertigo every since she got a swimming pool this year and went on a cruise.    Past Medical History:  She has a past medical history of Personal history of gestational diabetes and Personal history of other diseases of the musculoskeletal system and connective tissue.    Past Surgical History:  She has a past surgical history that includes Cardiac pacemaker placement (08/11/2016); Other surgical history (08/11/2016); Cholecystectomy (08/11/2016); Hysterectomy (08/11/2016); and Insert / replace / remove pacemaker (08/11/2016).      Social History:  She reports that she has never smoked. She has never used smokeless tobacco. No history on file for alcohol use and drug use.    Family History:  Family History   Problem Relation Name Age of Onset    Crohn's disease Mother      Hypertension Mother      Lung cancer Mother      Thyroid disease Mother      Diabetes Father      Hypertension Father      Thyroid disease Father          Allergies:  Aspirin, House dust mite, Mold, and Epinephrine    Review of Systems   Constitutional: Negative for chills and fever.   HENT:  Positive for congestion (nasal - currently taking antibiotics).    Cardiovascular:  Positive for leg swelling (in extreme heat) and palpitations (very rare and lasts a few seconds and gone). Negative for chest pain, dyspnea on exertion, irregular heartbeat, near-syncope, orthopnea and syncope.   Respiratory:  Negative for cough, shortness of breath and wheezing.    Musculoskeletal:   Positive for arthritis (fibromyalgia and arthritis) and joint pain (right hip; left shoulder).   Gastrointestinal:  Negative for hematochezia, melena, nausea and vomiting.   Genitourinary:  Negative for hematuria.   Neurological:  Positive for light-headedness (she attributes to vertigo).   Psychiatric/Behavioral:  Negative for altered mental status.      Objective      Vitals reviewed.   Constitutional:       Appearance: Not in distress.   Pulmonary:      Effort: Pulmonary effort is normal.      Breath sounds: Normal breath sounds.   Cardiovascular:      PMI at left midclavicular line. Normal rate. Regular rhythm. S1 with normal intensity. S2 with normal intensity.       Murmurs: There is no murmur.   Edema:     Peripheral edema absent.   Abdominal:      General: Bowel sounds are normal.   Neurological:      Mental Status: Alert and oriented to person, place and time.       Current Outpatient Medications   Medication Instructions    albuterol (ProAir HFA) 90 mcg/actuation inhaler 2 puffs, Every 6 hours PRN    apixaban (ELIQUIS) 5 mg, 2 times daily    BIOTIN ORAL 1 capsule, Daily    budesonide-formoteroL (Symbicort) 80-4.5 mcg/actuation inhaler 2 puffs, 2 times daily    calcium citrate 500 mg, 3 times daily    cholecalciferol (VITAMIN D-3) 50 mcg, Daily    cyanocobalamin, vitamin B-12, (VITAMIN B-12 ORAL) 1 tablet, Daily    EnbreL Mini 50 mg, Every 7 days    gabapentin (NEURONTIN) 100 mg, 3 times daily    levothyroxine (Synthroid, Levoxyl) 25 mcg tablet 1 tablet, Daily    MULTIVITAMIN ORAL 1 tablet, Daily    ranolazine (RANEXA) 500 mg, oral, Every 12 hours    vortioxetine (TRINTELLIX) 10 mg, Daily        Reviewed the following Labs:  CBC -  Lab Results   Component Value Date    WBC 5.8 10/06/2023    HGB 11.5 (L) 10/06/2023    HCT 35.3 (L) 10/06/2023    MCV 95 10/06/2023     10/06/2023       CMP -  Lab Results   Component Value Date    CALCIUM 9.3 10/06/2023    PHOS 3.7 05/29/2021    PROT 6.2 (L) 10/04/2023     ALBUMIN 3.8 10/04/2023    AST 19 10/04/2023    ALT 18 10/04/2023    ALKPHOS 74 10/04/2023    BILITOT 0.5 10/04/2023       LIPID PANEL -   Lab Results   Component Value Date    CHOL 181 11/25/2019    TRIG 94 11/25/2019    HDL 44.1 11/25/2019    CHHDL 4.1 11/25/2019    LDLF 118 (H) 11/25/2019    VLDL 19 11/25/2019       RENAL FUNCTION PANEL -   Lab Results   Component Value Date    GLUCOSE 91 10/06/2023     10/06/2023    K 3.8 10/06/2023     (H) 10/06/2023    CO2 28 10/06/2023    ANIONGAP 9 (L) 10/06/2023    BUN 22 10/06/2023    CREATININE 0.87 10/06/2023    CALCIUM 9.3 10/06/2023    PHOS 3.7 05/29/2021    ALBUMIN 3.8 10/04/2023        Lab Results   Component Value Date    HGBA1C 5.1 05/15/2023       No results found for this or any previous visit.     Reviewed the following Cardiology Tests:    Last PPM interrogation 5/1/25    Limited TTE 12/28/23  1. Left ventricular systolic function is mildly decreased with a 45% estimated ejection fraction.   2. Multiple segmental abnormalities exist. See findings.    Stress test 10/11/23  1. Indeterminate due to presence of nonspecific IVCD on baseline EKG.   2. Indeterminate Stress Test.   3. No clinical evidence for ischemia at maximal infusion.   4. Correlate with myocardial perfusion imaging results.   5. Nuclear image results are reported separately.  1. Fixed perfusion defects as noted above. No reversible perfusion   defects seen. There is a low probability of ischemia.   2. Calculated ejection fraction of 43% with apical akinesis.     Echo 10/6/23:   1. Left ventricular systolic function is mildly decreased with a 40-45% estimated ejection fraction.   2. Multiple segmental abnormalities exist. See findings.   3. Spectral Doppler shows an impaired relaxation pattern of left ventricular diastolic filling.   4. There is mildly reduced right ventricular systolic function.   5. RVSP within normal limits.   6. There are multiple wall motion abnormalities.    [facility]  "2/10/22:    1. No significant CAD. Consider microvascular disease.   2. No significant LV-AO peak to peak pullback gradient.     Stress test 10/11/23:   Summary:   1. Indeterminate due to presence of nonspecific IVCD on baseline EKG.   2. Indeterminate Stress Test.   3. No clinical evidence for ischemia at maximal infusion.   4. Correlate with myocardial perfusion imaging results.   5. Nuclear image results are reported separately.    There is a small, moderate intensity, fixed perfusion defect in the  apical/inferoapical wall with associated wall motion abnormality  consistent with prior infarct. There is a small to moderate size,  mild intensity, fixed perfusion defect in the inferior wall. No  reversible perfusion defects seen.      Calculated ejection fraction of 43% with apical akinesis.    1. Fixed perfusion defects as noted above. No reversible perfusion  defects seen. There is a low probability of ischemia.  2. Calculated ejection fraction of 43% with apical akinesis.    Visit Vitals  /72 (BP Location: Right arm)   Pulse 60   Ht 1.689 m (5' 6.5\")   Wt 90.7 kg (200 lb)   LMP  (LMP Unknown)   BMI 31.80 kg/m²   OB Status Hysterectomy   Smoking Status Never   BSA 2.06 m²       Assessment/Plan   The primary encounter diagnosis was Chronic systolic (congestive) heart failure. Diagnoses of Presence of cardiac pacemaker, Microvascular angina, and CHB (complete heart block) were also pertinent to this visit.    1. Nonischemic cardiomyopathy; chronic HFrEF   Patient appears well compensated on exam.  TTE Oct 2023 with LVEF 40-45%  Repeat limited TTE Dec 2023 with LVEF 45%  Patient previously stopped carvedilol, losartan, isosorbide and spironolactone d/t dizziness s/p weight loss surgery.  Dizziness has resolved.  BP stable.   Dr. Cierra Sanchez discussed last office visit that medicines for ADHD including atomoxetine and adderall - the CV risks of these meds, including sudden cardiac death, tachycardia/HTN, CVA, " MI, etc.    With her cardiomyopathy, we discussed that she may be at higher risk for SCD than someone without cardiomyopathy on these medications.  If GDMT is ever added, would start with beta blocker such as carvedilol or metoprolol succinate.     2. Complete heart block:   Patient has a history of congenital complete heart block.   She has a [employer] pacemaker - right side of chest  Follows with  [facility]     3 Atypical chest pain/possible microvascular angina:   Continue Ranexa 500 mg BID  She stopped isosorbide previously (in addition to carvedilol, losartan and spironolactone)  Stress test Oct 2023 with low probability of ischemia  If she needs to go back on nitrate would need to consider isosorbide dinitrate as she is now s/p gastric sleeve    4. Factor V Leiden, PE March 2025  Was on Xarelto prior and following gastric sleeve  Had PE after skin removal surgery  Now on apixaban 5 mg BID  Follows with hematology and pulmonology    5. Dizziness, lightheadedness  Resolved after stopping carvedilol, losartan, spironolactone and isosorbide    Roxy Ortega, APRN-CNP

## 2025-08-06 ENCOUNTER — OFFICE VISIT (OUTPATIENT)
Dept: CARDIOLOGY | Facility: HOSPITAL | Age: 50
End: 2025-08-06
Payer: COMMERCIAL

## 2025-08-06 VITALS
WEIGHT: 200 LBS | HEART RATE: 60 BPM | BODY MASS INDEX: 31.39 KG/M2 | SYSTOLIC BLOOD PRESSURE: 128 MMHG | HEIGHT: 67 IN | DIASTOLIC BLOOD PRESSURE: 72 MMHG

## 2025-08-06 DIAGNOSIS — I44.2 CHB (COMPLETE HEART BLOCK): ICD-10-CM

## 2025-08-06 DIAGNOSIS — I50.22 CHRONIC SYSTOLIC (CONGESTIVE) HEART FAILURE: Primary | ICD-10-CM

## 2025-08-06 DIAGNOSIS — Z95.0 PRESENCE OF CARDIAC PACEMAKER: ICD-10-CM

## 2025-08-06 DIAGNOSIS — I20.89 MICROVASCULAR ANGINA: ICD-10-CM

## 2025-08-06 PROCEDURE — 3074F SYST BP LT 130 MM HG: CPT | Performed by: NURSE PRACTITIONER

## 2025-08-06 PROCEDURE — 3008F BODY MASS INDEX DOCD: CPT | Performed by: NURSE PRACTITIONER

## 2025-08-06 PROCEDURE — 99213 OFFICE O/P EST LOW 20 MIN: CPT | Performed by: NURSE PRACTITIONER

## 2025-08-06 PROCEDURE — 1036F TOBACCO NON-USER: CPT | Performed by: NURSE PRACTITIONER

## 2025-08-06 PROCEDURE — 99212 OFFICE O/P EST SF 10 MIN: CPT

## 2025-08-06 PROCEDURE — 3078F DIAST BP <80 MM HG: CPT | Performed by: NURSE PRACTITIONER

## 2025-08-06 ASSESSMENT — ENCOUNTER SYMPTOMS
LOSS OF SENSATION IN FEET: 0
PALPITATIONS: 1
LIGHT-HEADEDNESS: 1
OCCASIONAL FEELINGS OF UNSTEADINESS: 0
DEPRESSION: 0

## 2025-08-14 ENCOUNTER — HOSPITAL ENCOUNTER (OUTPATIENT)
Dept: CARDIOLOGY | Facility: HOSPITAL | Age: 50
Discharge: HOME | End: 2025-08-14
Payer: COMMERCIAL

## 2025-08-14 DIAGNOSIS — I44.2 ATRIOVENTRICULAR BLOCK, COMPLETE (MULTI): ICD-10-CM

## 2025-08-14 PROCEDURE — 93296 REM INTERROG EVL PM/IDS: CPT
